# Patient Record
Sex: FEMALE | Race: WHITE | NOT HISPANIC OR LATINO | Employment: OTHER | URBAN - METROPOLITAN AREA
[De-identification: names, ages, dates, MRNs, and addresses within clinical notes are randomized per-mention and may not be internally consistent; named-entity substitution may affect disease eponyms.]

---

## 2018-06-16 ENCOUNTER — HOSPITAL ENCOUNTER (EMERGENCY)
Facility: CLINIC | Age: 63
Discharge: HOME | End: 2018-06-16
Attending: EMERGENCY MEDICINE | Admitting: EMERGENCY MEDICINE
Payer: MEDICARE

## 2018-06-16 ENCOUNTER — APPOINTMENT (OUTPATIENT)
Dept: GENERAL RADIOLOGY | Facility: CLINIC | Age: 63
End: 2018-06-16
Attending: EMERGENCY MEDICINE
Payer: MEDICARE

## 2018-06-16 VITALS
HEIGHT: 64 IN | OXYGEN SATURATION: 98 % | BODY MASS INDEX: 22.02 KG/M2 | WEIGHT: 129 LBS | RESPIRATION RATE: 16 BRPM | TEMPERATURE: 99 F | HEART RATE: 67 BPM | SYSTOLIC BLOOD PRESSURE: 131 MMHG | DIASTOLIC BLOOD PRESSURE: 65 MMHG

## 2018-06-16 DIAGNOSIS — S62.102A FRACTURE OF WRIST, LEFT, CLOSED, INITIAL ENCOUNTER: ICD-10-CM

## 2018-06-16 PROCEDURE — 29125 APPL SHORT ARM SPLINT STATIC: CPT | Mod: LT

## 2018-06-16 PROCEDURE — 73100 X-RAY EXAM OF WRIST: CPT | Mod: LT

## 2018-06-16 PROCEDURE — 73080 X-RAY EXAM OF ELBOW: CPT | Mod: LT

## 2018-06-16 PROCEDURE — A9270 NON-COVERED ITEM OR SERVICE: HCPCS | Mod: GY | Performed by: EMERGENCY MEDICINE

## 2018-06-16 PROCEDURE — 25000132 ZZH RX MED GY IP 250 OP 250 PS 637: Mod: GY | Performed by: EMERGENCY MEDICINE

## 2018-06-16 PROCEDURE — 99284 EMERGENCY DEPT VISIT MOD MDM: CPT

## 2018-06-16 RX ORDER — HYDROCODONE BITARTRATE AND ACETAMINOPHEN 5; 325 MG/1; MG/1
1 TABLET ORAL ONCE
Status: COMPLETED | OUTPATIENT
Start: 2018-06-16 | End: 2018-06-16

## 2018-06-16 RX ORDER — HYDROCODONE BITARTRATE AND ACETAMINOPHEN 5; 325 MG/1; MG/1
1 TABLET ORAL EVERY 4 HOURS PRN
Qty: 18 TABLET | Refills: 0 | Status: SHIPPED | OUTPATIENT
Start: 2018-06-16 | End: 2024-08-02

## 2018-06-16 RX ADMIN — HYDROCODONE BITARTRATE AND ACETAMINOPHEN 1 TABLET: 5; 325 TABLET ORAL at 11:47

## 2018-06-16 NOTE — ED TRIAGE NOTES
Pt slipped and fell yesterday while walking on slippery wet grass.  She has splint on left arm where she reports distal fracture.  She now has pain in elbow and feels the cast is too big and too tight.

## 2018-06-16 NOTE — ED AVS SNAPSHOT
[facility] Emergency Department    [address]    [facility] [address]    Phone:  [phone]    Fax:  [phone]                                       Hoa Britt   MRN: [id number]    Department:  [facility] Emergency Department   Date of Visit:  6/16/2018           After Visit Summary Signature Page     I have received my discharge instructions, and my questions have been answered. I have discussed any challenges I see with this plan with the nurse or doctor.    ..........................................................................................................................................  Patient/Patient Representative Signature      ..........................................................................................................................................  Patient Representative Print Name and Relationship to Patient    ..................................................               ................................................  Date                                            Time    ..........................................................................................................................................  Reviewed by Signature/Title    ...................................................              ..............................................  Date                                                            Time

## 2018-06-16 NOTE — ED PROVIDER NOTES
"  History     Chief Complaint:  Arm Pain    HPI   Hoa Britt is a 63 year old female who presents with her friendly neighbor for evaluation of arm pain. The patient reports that she slipped and fell yesterday while walking on slippery wet grass. Patient reports that she landed on the left elbow. Today she presents to the ED with a splint on left arm where she reports she had a distal fracture.  In addition, the patient reports that her  passed away last night after a long history of illness. She reports that the  is tomorrow and would like to have the splint taken care off because it feels too bulky and is too tight.     Allergies:  No Known Allergies     Medications:    Ativan  Hydrocone - acetaminophen  Antivert  Multivitamins  Zofran  Paxil  Compazine  Evista  Ambien CR PO     Past Medical History:    small bowel obstruction  Hypotension  Nausea and vomiting  Volvulus  Neck pain    Past Surgical History:    Colectomy  Colonoscopy  Laparotomy  Resect Small bowel without ostomy    Family History:    No family history on file.    Social History:  The patient reports that she has never smoked. She does not have any smokeless tobacco history on file. She reports that she does not drink alcohol.   Marital Status:   [2]     Review of Systems   All other systems reviewed and are negative.    Physical Exam   First Vitals:  BP: 127/84  Pulse: 67  Heart Rate: 97  Temp: 99  F (37.2  C)  Resp: 18  Height: 162.6 cm (5' 4\")  Weight: 58.5 kg (129 lb)  SpO2: 98 %      Physical Exam  General: Pleasant, comfortable and alert.   HEENT:   The scalp and head appear normal    The pupils are equal, round, and reactive to light    Extraocular muscles are intact.    The nose is normal.    The oropharynx is normal.      Uvula is in the midline.    Neck:  Normal range of motion.    Lungs:  Clear.      No rales, no wheezing.      There is no tachypnea.  Non-labored.  Cardiac: Regular rate.      Normal S1 and S2.  "     No pathological murmur/rub    Abdomen: Soft. No distension, no localized tenderness or rebound.  MS:  Sugar tong splint on left upper extremity removed because of tightness, revealed swelling at the wrist.   No effusion at elbow.   Neurologic:     Normal mentation.  No cranial nerve deficits.  No focal motor or sensory                            changes.  Neurovascular exam intact distally.     Speech normal.  Psych:  Awake.     Alert.      Normal affect.      Appropriate interactions.    Sad at times, Grieving the loss of her .   Skin:  No rash.      No lesions.      Emergency Department Course   Imaging:  XR Wrist Left 2 Views   Final Result   IMPRESSION: There is a minimally impacted, comminuted, nonangulated   fracture through the distal metaphysis of the left radius. There is a   nondisplaced fracture of the left ulnar styloid. No other fractures.      SHANNON PADGETT MD      Elbow  XR, G/E 3 views, left   Final Result   IMPRESSION: No evidence for fracture, dislocation or significant   degenerative change of the left elbow.      SHANNON PADGETT MD         Interventions:  1147: [place]        Emergency Department Course:  Past medical records, nursing notes, and vitals reviewed.  1028: I performed an exam of the patient and obtained history, as documented above.         The patient was sent for imaging study while in the emergency department, findings above.     I spoke with Dr. Mcduffie regarding the patient.     Rechecked the patient, findings and plan explained to the patient. Patient discharged home, status improved, with instructions regarding supportive care, medications, and reasons to return as well as the importance of close follow-up was reviewed.     Impression & Plan    Medical Decision Making:  Hoa Britt is a 63 year old female who fractured her wrist yesterday and was seen at Urgent Care, splinted appropriately, who comes in with complaining of pain in the splint. She also reports that  jenny has a  to go to tomorrow of her  who just  yesterday and doesn't want her elbow immobilized. I spoke with Dr. Mcduffie who is on call for ortho who looked at the X rays and felt that as long as she did not bend her wrist and use her left arm or hand a lot, she could be placed in the [place], short arm wrist splint which we did. She feels much more comfortable now. She also did not receive any analgesics with which to go home with yesterday. We will send her home with Vicodin. Gave her precautionary instructions to follow up with ortho early next week. She reports that she can't go in until Thursday or Friday, today being Saturday. If she develops problems in the interim she should return here.       Diagnosis:    ICD-10-CM    1. Fracture of wrist, left, closed, initial encounter S62.102A        Disposition:  discharged to home    Discharge Medications:  New Prescriptions    HYDROCODONE-ACETAMINOPHEN (NORCO) 5-325 MG PER TABLET    Take 1 tablet by mouth every 4 hours as needed for pain     Macy COATES, brandy serving as a scribe at 10:28 AM on 2018 to document services personally performed by Marshall Mcgraw MD based on my observations and the provider's statements to me.     [facility] EMERGENCY DEPARTMENT       Marshall Mcgraw MD  [phone]

## 2018-06-16 NOTE — DISCHARGE INSTRUCTIONS
Wrist Fracture, General  You have a broken bone (fracture) in your wrist. This may be a small crack or chip in the bone. Or it may be a major break, with the broken parts pushed out of position. Wrist fractures are often treated with a splint or cast. They take about 4 to 6 weeks to heal. Severe injuries may need surgery.    Home care  Follow these guidelines when caring for yourself at home:    Keep your arm elevated to reduce pain and swelling. When sitting or lying down keep your arm above the level of your heart. You can do this by placing your arm on a pillow that rests on your chest or on a pillow at your side. This is most important during the first 2 days (48 hours) after the injury.    Put an ice pack on the injured area. Do this for 20 minutes every 1 to 2 hours the first day for pain relief. You can make an ice pack by wrapping a plastic bag of ice cubes in a thin towel. As the ice melts, be careful that the cast or splint doesn t get wet. Continue using the ice pack 3 to 4 times a day for the next 2 days. Then use the ice pack as needed to ease pain and swelling.    Keep the cast or splint completely dry at all times. Bathe with your cast or splint out of the water. Protect it with a large plastic bag, rubber-banded at the top end. If a fiberglass cast or splint gets wet, you can dry it with a hair dryer on a cool setting.    You may use acetaminophen or ibuprofen to control pain, unless another pain medicine was prescribed. If you have chronic liver or kidney disease, talk with your healthcare provider before using these medicines. Also talk with your provider if you ve had a stomach ulcer or gastrointestinal bleeding.    Don t put creams, lotions, or objects under the cast.  Follow-up care  Follow up with your healthcare provider, or as advised. This is to make sure the bone is healing the way it should. If a splint was put on, it may be changed to a cast during your follow-up visit. A cast may need  to be changed at 2 to 3 weeks, as the swelling goes down.  If X-rays were taken, a radiologist may look at them. You will be told of any new findings that may affect your care.  When to seek medical advice  Call your healthcare provider right away if any of these occur:    The plaster cast or splint becomes wet or soft    The cast or splint cracks    Bad odor from the cast or wound fluid stains the cast    The fiberglass cast or splint stays wet for more than 24 hours    Tightness or pain under the cast or splint gets worse    Fingers become swollen, cold, blue, numb, or tingly    You can t move your fingers    Skin around cast becomes red, swollen, or irritated  Date Last Reviewed: 5/1/2017 2000-2017 [employer]. [address] [place] [address], [place] [address]. All rights reserved. This information is not intended as a substitute for professional medical care. Always follow your healthcare professional's instructions.

## 2018-06-16 NOTE — ED AVS SNAPSHOT
[facility] Emergency Department    [address] [place] [address]    Phone:  [phone]    Fax:  [phone]                                       Hoa Britt   MRN: [id number]    Department:  [facility] Emergency Department   Date of Visit:  6/16/2018           Patient Information     Date Of Birth          1955        Your diagnoses for this visit were:     Fracture of wrist, left, closed, initial encounter        You were seen by Marshall Mcgraw MD.      Follow-up Information     Follow up with Claire Nicole MD.    Specialty:  Orthopedics    Contact information:    [facility] ORTHOPEDICS  [facility] [address] [address]  [phone]          Discharge Instructions         Wrist Fracture, General  You have a broken bone (fracture) in your wrist. This may be a small crack or chip in the bone. Or it may be a major break, with the broken parts pushed out of position. Wrist fractures are often treated with a splint or cast. They take about 4 to 6 weeks to heal. Severe injuries may need surgery.    Home care  Follow these guidelines when caring for yourself at home:    Keep your arm elevated to reduce pain and swelling. When sitting or lying down keep your arm above the level of your heart. You can do this by placing your arm on a pillow that rests on your chest or on a pillow at your side. This is most important during the first 2 days (48 hours) after the injury.    Put an ice pack on the injured area. Do this for 20 minutes every 1 to 2 hours the first day for pain relief. You can make an ice pack by wrapping a plastic bag of ice cubes in a thin towel. As the ice melts, be careful that the cast or splint doesn t get wet. Continue using the ice pack 3 to 4 times a day for the next 2 days. Then use the ice pack as needed to ease pain and swelling.    Keep the cast or splint completely dry at all times. Bathe with your cast or splint out of the  water. Protect it with a large plastic bag, rubber-banded at the top end. If a fiberglass cast or splint gets wet, you can dry it with a hair dryer on a cool setting.    You may use acetaminophen or ibuprofen to control pain, unless another pain medicine was prescribed. If you have chronic liver or kidney disease, talk with your healthcare provider before using these medicines. Also talk with your provider if you ve had a stomach ulcer or gastrointestinal bleeding.    Don t put creams, lotions, or objects under the cast.  Follow-up care  Follow up with your healthcare provider, or as advised. This is to make sure the bone is healing the way it should. If a splint was put on, it may be changed to a cast during your follow-up visit. A cast may need to be changed at 2 to 3 weeks, as the swelling goes down.  If X-rays were taken, a radiologist may look at them. You will be told of any new findings that may affect your care.  When to seek medical advice  Call your healthcare provider right away if any of these occur:    The plaster cast or splint becomes wet or soft    The cast or splint cracks    Bad odor from the cast or wound fluid stains the cast    The fiberglass cast or splint stays wet for more than 24 hours    Tightness or pain under the cast or splint gets worse    Fingers become swollen, cold, blue, numb, or tingly    You can t move your fingers    Skin around cast becomes red, swollen, or irritated  Date Last Reviewed: 5/1/2017 2000-2017 [employer]. [address] [place] [address]. All rights reserved. This information is not intended as a substitute for professional medical care. Always follow your healthcare professional's instructions.          24 Hour Appointment Hotline       To make an appointment at any [facility], call [phone] ([phone]). If you don't have a family doctor or clinic, we will help you find one. [facility] are conveniently located to  serve the needs of you and your family.             Review of your medicines      CONTINUE these medicines which may have CHANGED, or have new prescriptions. If we are uncertain of the size of tablets/capsules you have at home, strength may be listed as something that might have changed.        Dose / Directions Last dose taken    HYDROcodone-acetaminophen 5-325 MG per tablet   Commonly known as:  NORCO   Dose:  1 tablet   What changed:    - how much to take  - reasons to take this   Quantity:  18 tablet        Take 1 tablet by mouth every 4 hours as needed for pain   Refills:  0          Our records show that you are taking the medicines listed below. If these are incorrect, please call your family doctor or clinic.        Dose / Directions Last dose taken    AMBIEN CR PO   Dose:  6.25-12.5 mg        Take 6.25-12.5 mg by mouth nightly as needed. Usually takes 1/2 of a 12.5mg CR at hs prn.   Refills:  0        * LORazepam 2 MG tablet   Commonly known as:  ATIVAN   Dose:  1 mg   Quantity:  14 tablet        Take 0.5 tablets by mouth nightly as needed for anxiety.   Refills:  0        * LORazepam 2 MG tablet   Commonly known as:  ATIVAN   Dose:  0.5-1 mg   Quantity:  30 tablet        Take 0.5 tablets by mouth every 6 hours as needed for anxiety.   Refills:  0        meclizine 12.5 MG tablet   Commonly known as:  ANTIVERT   Dose:  25 mg   Quantity:  30 tablet        Take 2 tablets by mouth 4 times daily as needed for dizziness.   Refills:  0        multivitamin, therapeutic with minerals Tabs tablet   Dose:  1 tablet        Take 1 tablet by mouth daily.   Refills:  0        ondansetron 4 MG ODT tab   Commonly known as:  ZOFRAN-ODT   Dose:  4 mg        Take 4 mg by mouth every 8 hours as needed. new Rx 4-4-08-patient unsure of dose.   Refills:  0        PARoxetine 20 MG tablet   Commonly known as:  PAXIL   Dose:  20 mg   Quantity:  30 tablet        Take 1 tablet by mouth At Bedtime.   Refills:  1        prochlorperazine  5 MG tablet   Commonly known as:  COMPAZINE   Dose:  5-10 mg   Quantity:  20 tablet        Take 1-2 tablets by mouth every 6 hours as needed.   Refills:  0        raloxifene 60 MG tablet   Commonly known as:  Evista   Dose:  60 mg        Take 60 mg by mouth daily.   Refills:  0        * Notice:  This list has 2 medication(s) that are the same as other medications prescribed for you. Read the directions carefully, and ask your doctor or other care provider to review them with you.            Information about OPIOIDS     PRESCRIPTION OPIOIDS: WHAT YOU NEED TO KNOW   We gave you an opioid (narcotic) pain medicine. It is important to manage your pain, but opioids are not always the best choice. You should first try all the other options your care team gave you. Take this medicine for as short a time (and as few doses) as possible.     These medicines have risks:    DO NOT drive when on new or higher doses of pain medicine. These medicines can affect your alertness and reaction times, and you could be arrested for driving under the influence (DUI). If you need to use opioids long-term, talk to your care team about driving.    DO NOT operate heave machinery    DO NOT do any other dangerous activities while taking these medicines.     DO NOT drink any alcohol while taking these medicines.      If the opioid prescribed includes acetaminophen, DO NOT take with any other medicines that contain acetaminophen. Read all labels carefully. Look for the word  acetaminophen  or  Tylenol.  Ask your pharmacist if you have questions or are unsure.    You can get addicted to pain medicines, especially if you have a history of addiction (chemical, alcohol or substance dependence). Talk to your care team about ways to reduce this risk.    Store your pills in a secure place, locked if possible. We will not replace any lost or stolen medicine. If you don t finish your medicine, please throw away (dispose) as directed by your pharmacist.  The [place] Pollution Control Agency has more information about safe disposal: https://www.pca.[facility].[place]/living-green/managing-unwanted-medications.     All opioids tend to cause constipation. Drink plenty of water and eat foods that have a lot of fiber, such as fruits, vegetables, prune juice, apple juice and high-fiber cereal. Take a laxative (Miralax, milk of magnesia, Colace, Senna) if you don t move your bowels at least every other day.         Prescriptions were sent or printed at these locations (1 Prescription)                   Other Prescriptions                Printed at Department/Unit printer (1 of 1)         HYDROcodone-acetaminophen (NORCO) 5-325 MG per tablet                Procedures and tests performed during your visit     Elbow  XR, G/E 3 views, left    XR Wrist Left 2 Views      Orders Needing Specimen Collection     None      Pending Results     No orders found from 6/14/2018 to 6/17/2018.            Pending Culture Results     No orders found from 6/14/2018 to 6/17/2018.            Pending Results Instructions     If you had any lab results that were not finalized at the time of your Discharge, you can call the ED Lab Result RN at [phone]. You will be contacted by this team for any positive Lab results or changes in treatment. The nurses are available 7 days a week from 10A to 6:30P.  You can leave a message 24 hours per day and they will return your call.        Test Results From Your Hospital Stay        6/16/2018 11:18 AM      Narrative     LEFT WRIST TWO VIEWS  6/16/2018 11:03 AM     COMPARISON: None.    HISTORY: Wrist injury.         Impression     IMPRESSION: There is a minimally impacted, comminuted, nonangulated  fracture through the distal metaphysis of the left radius. There is a  nondisplaced fracture of the left ulnar styloid. No other fractures.    SHANNON PADGETT MD         6/16/2018 11:17 AM      Narrative     LEFT ELBOW THREE OR MORE VIEWS  6/16/2018 11:03 AM      COMPARISON: None.    HISTORY: Injury.     FINDINGS: The visualized bones and joint spaces are within normal  limits.        Impression     IMPRESSION: No evidence for fracture, dislocation or significant  degenerative change of the left elbow.    SHANNON PADGETT MD                Clinical Quality Measure: Blood Pressure Screening     Your blood pressure was checked while you were in the emergency department today. The last reading we obtained was  BP: 127/84 . Please read the guidelines below about what these numbers mean and what you should do about them.  If your systolic blood pressure (the top number) is less than 120 and your diastolic blood pressure (the bottom number) is less than 80, then your blood pressure is normal. There is nothing more that you need to do about it.  If your systolic blood pressure (the top number) is 120-139 or your diastolic blood pressure (the bottom number) is 80-89, your blood pressure may be higher than it should be. You should have your blood pressure rechecked within a year by a primary care provider.  If your systolic blood pressure (the top number) is 140 or greater or your diastolic blood pressure (the bottom number) is 90 or greater, you may have high blood pressure. High blood pressure is treatable, but if left untreated over time it can put you at risk for heart attack, stroke, or kidney failure. You should have your blood pressure rechecked by a primary care provider within the next 4 weeks.  If your provider in the emergency department today gave you specific instructions to follow-up with your doctor or provider even sooner than that, you should follow that instruction and not wait for up to 4 weeks for your follow-up visit.        Thank you for choosing [facility]       Thank you for choosing [facility] for your care. Our goal is always to provide you with excellent care. Hearing back from our patients is one way we can continue to improve our services. Please take a few  "minutes to complete the written survey that you may receive in the mail after you visit with us. Thank you!        [employer] Information     [employer] lets you send messages to your doctor, view your test results, renew your prescriptions, schedule appointments and more. To sign up, go to [email or web address] [email or web address] . Click on \"Log in\" on the left side of the screen, which will take you to the Welcome page. Then click on \"Sign up Now\" on the right side of the page.     You will be asked to enter the access code listed below, as well as some personal information. Please follow the directions to create your username and password.     Your access code is: [email or web address]  Expires: 2018 12:32 PM     Your access code will  in 90 days. If you need help or a new code, please call your [facility] clinic or [phone].        Care EveryWhere ID     This is your Care EveryWhere ID. This could be used by other organizations to access your [facility] medical records  [id number]        Equal Access to Services     [facility]: Hadii evon haider [place], waaxda luqadaha, qaybta kaalmaumberto arciniega, connor abreu . So [facility] [phone].    ATENCIÓN: Si habla español, tiene a ellis disposición servicios gratuitos de asistencia lingüística. Llame al [phone].    We comply with applicable federal civil rights laws and [place] laws. We do not discriminate on the basis of race, color, national origin, age, disability, sex, sexual orientation, or gender identity.            After Visit Summary       This is your record. Keep this with you and show to your community pharmacist(s) and doctor(s) at your next visit.                  "

## 2020-12-28 ENCOUNTER — HOSPITAL ENCOUNTER (OUTPATIENT)
Dept: ULTRASOUND IMAGING | Facility: CLINIC | Age: 65
End: 2020-12-28
Attending: FAMILY MEDICINE
Payer: MEDICARE

## 2020-12-28 ENCOUNTER — HOSPITAL ENCOUNTER (OUTPATIENT)
Dept: MAMMOGRAPHY | Facility: CLINIC | Age: 65
End: 2020-12-28
Attending: FAMILY MEDICINE
Payer: MEDICARE

## 2020-12-28 DIAGNOSIS — R92.8 ABNORMAL MAMMOGRAM: ICD-10-CM

## 2020-12-28 PROCEDURE — G0279 TOMOSYNTHESIS, MAMMO: HCPCS

## 2020-12-28 PROCEDURE — 76642 ULTRASOUND BREAST LIMITED: CPT | Mod: RT

## 2021-04-29 DIAGNOSIS — Z11.59 ENCOUNTER FOR SCREENING FOR OTHER VIRAL DISEASES: ICD-10-CM

## 2021-05-30 ENCOUNTER — HOSPITAL ENCOUNTER (OUTPATIENT)
Dept: LAB | Facility: CLINIC | Age: 66
Discharge: HOME | End: 2021-05-30
Attending: INTERNAL MEDICINE | Admitting: INTERNAL MEDICINE
Payer: MEDICARE

## 2021-05-30 DIAGNOSIS — Z11.59 ENCOUNTER FOR SCREENING FOR OTHER VIRAL DISEASES: ICD-10-CM

## 2021-05-30 LAB
LABORATORY COMMENT REPORT: NORMAL
SARS-COV-2 RNA RESP QL NAA+PROBE: NEGATIVE
SARS-COV-2 RNA RESP QL NAA+PROBE: NORMAL
SPECIMEN SOURCE: NORMAL
SPECIMEN SOURCE: NORMAL

## 2021-05-30 PROCEDURE — U0003 INFECTIOUS AGENT DETECTION BY NUCLEIC ACID (DNA OR RNA); SEVERE ACUTE RESPIRATORY SYNDROME CORONAVIRUS 2 (SARS-COV-2) (CORONAVIRUS DISEASE [COVID-19]), AMPLIFIED PROBE TECHNIQUE, MAKING USE OF HIGH THROUGHPUT TECHNOLOGIES AS DESCRIBED BY CMS-2020-01-R: HCPCS | Performed by: INTERNAL MEDICINE

## 2021-05-30 PROCEDURE — U0005 INFEC AGEN DETEC AMPLI PROBE: HCPCS | Performed by: INTERNAL MEDICINE

## 2021-06-01 RX ORDER — FAMOTIDINE 40 MG/1
40 TABLET, FILM COATED ORAL 2 TIMES DAILY
COMMUNITY

## 2021-06-01 RX ORDER — AMLODIPINE BESYLATE 5 MG/1
5 TABLET ORAL DAILY
COMMUNITY

## 2021-06-03 ENCOUNTER — HOSPITAL ENCOUNTER (OUTPATIENT)
Facility: CLINIC | Age: 66
Discharge: HOME | End: 2021-06-03
Attending: INTERNAL MEDICINE | Admitting: INTERNAL MEDICINE
Payer: MEDICARE

## 2021-06-03 VITALS
TEMPERATURE: 99 F | SYSTOLIC BLOOD PRESSURE: 121 MMHG | RESPIRATION RATE: 16 BRPM | OXYGEN SATURATION: 96 % | HEIGHT: 65 IN | WEIGHT: 123 LBS | BODY MASS INDEX: 20.49 KG/M2 | HEART RATE: 52 BPM | DIASTOLIC BLOOD PRESSURE: 66 MMHG

## 2021-06-03 LAB — UPPER GI ENDOSCOPY: NORMAL

## 2021-06-03 PROCEDURE — 999N000099 HC STATISTIC MODERATE SEDATION < 10 MIN: Performed by: INTERNAL MEDICINE

## 2021-06-03 PROCEDURE — 88305 TISSUE EXAM BY PATHOLOGIST: CPT | Mod: TC | Performed by: INTERNAL MEDICINE

## 2021-06-03 PROCEDURE — 88305 TISSUE EXAM BY PATHOLOGIST: CPT | Mod: 26 | Performed by: PATHOLOGY

## 2021-06-03 PROCEDURE — 250N000011 HC RX IP 250 OP 636: Performed by: INTERNAL MEDICINE

## 2021-06-03 PROCEDURE — 43239 EGD BIOPSY SINGLE/MULTIPLE: CPT | Performed by: INTERNAL MEDICINE

## 2021-06-03 RX ORDER — LIDOCAINE 40 MG/G
CREAM TOPICAL
Status: DISCONTINUED | OUTPATIENT
Start: 2021-06-03 | End: 2021-06-03 | Stop reason: HOSPADM

## 2021-06-03 RX ORDER — NALOXONE HYDROCHLORIDE 0.4 MG/ML
0.4 INJECTION, SOLUTION INTRAMUSCULAR; INTRAVENOUS; SUBCUTANEOUS
Status: DISCONTINUED | OUTPATIENT
Start: 2021-06-03 | End: 2021-06-03 | Stop reason: HOSPADM

## 2021-06-03 RX ORDER — ONDANSETRON 2 MG/ML
4 INJECTION INTRAMUSCULAR; INTRAVENOUS EVERY 6 HOURS PRN
Status: DISCONTINUED | OUTPATIENT
Start: 2021-06-03 | End: 2021-06-03 | Stop reason: HOSPADM

## 2021-06-03 RX ORDER — ONDANSETRON 4 MG/1
4 TABLET, ORALLY DISINTEGRATING ORAL EVERY 6 HOURS PRN
Status: DISCONTINUED | OUTPATIENT
Start: 2021-06-03 | End: 2021-06-03 | Stop reason: HOSPADM

## 2021-06-03 RX ORDER — NALOXONE HYDROCHLORIDE 0.4 MG/ML
0.2 INJECTION, SOLUTION INTRAMUSCULAR; INTRAVENOUS; SUBCUTANEOUS
Status: DISCONTINUED | OUTPATIENT
Start: 2021-06-03 | End: 2021-06-03 | Stop reason: HOSPADM

## 2021-06-03 RX ORDER — FENTANYL CITRATE 50 UG/ML
INJECTION, SOLUTION INTRAMUSCULAR; INTRAVENOUS PRN
Status: COMPLETED | OUTPATIENT
Start: 2021-06-03 | End: 2021-06-03

## 2021-06-03 RX ORDER — PROCHLORPERAZINE MALEATE 5 MG
5 TABLET ORAL EVERY 6 HOURS PRN
Status: DISCONTINUED | OUTPATIENT
Start: 2021-06-03 | End: 2021-06-03 | Stop reason: HOSPADM

## 2021-06-03 RX ORDER — FLUMAZENIL 0.1 MG/ML
0.2 INJECTION, SOLUTION INTRAVENOUS
Status: DISCONTINUED | OUTPATIENT
Start: 2021-06-03 | End: 2021-06-03 | Stop reason: HOSPADM

## 2021-06-03 RX ORDER — ONDANSETRON 2 MG/ML
4 INJECTION INTRAMUSCULAR; INTRAVENOUS
Status: DISCONTINUED | OUTPATIENT
Start: 2021-06-03 | End: 2021-06-03 | Stop reason: HOSPADM

## 2021-06-03 RX ADMIN — FENTANYL CITRATE 50 MCG: 50 INJECTION, SOLUTION INTRAMUSCULAR; INTRAVENOUS at 08:07

## 2021-06-03 RX ADMIN — MIDAZOLAM 1 MG: 1 INJECTION INTRAMUSCULAR; INTRAVENOUS at 08:07

## 2021-06-03 ASSESSMENT — MIFFLIN-ST. JEOR: SCORE: 1080.86

## 2021-06-03 NOTE — LETTER
April 21, 2021      Hoa Britt  [address] [facility] [id number]        Dear Hoa,       Thank you for choosing [facility]. You are scheduled for the following service(s).   Please be aware that coverage of these services is subject to the terms and limitations of your health insurance plan.  Call member services at your health plan with any benefit or coverage questions.    Date:   5/13/21      Procedure: UPPER ENDOSCOPY-EGD  Doctor: Dr. Ramirez Art           Arrival Time:  8:00am    *Enter and check in at the Main Hospital Entrance  Procedure Time: 8:30am       Location:   [facility]        Endoscopy Department, First Floor *         [address] [place], [place] [address] [phone] or [phone] () to reschedule          PRE-PROCEDURE CHECKLIST    If you have diabetes, ask your regular doctor for diet and medication restrictions.  If you take any antiplatelet or anticoagulant medications (such as Coumadin, Lovenox, Plavix, etc.) and have not already discussed this, please call your primary physician for advice on holding this medication.  If you take Aspirin, you may continue to do so.  If you are or may be pregnant, please discuss the risks and benefits of this procedure with your doctor.  You must arrange for a ride for the day of your exam. If you fail to arrange transportation with a responsible adult, your procedure will need to be cancelled and rescheduled. Taxi, bus and medical transport are not acceptable unless you have a responsible adult that you know & trust with you. Please arrange for this  to be able to pick you up in our department, approximately one hour after your scheduled procedure, if they are not able to stay with you.      Canceling or rescheduling   If you must cancel or reschedule your appointment, please call [phone] as soon as possible.      Upper Endoscopy or  Esophagogastroduodenoscopy (EGD) is a test performed to evaluate symptoms of persistent abdominal pain, nausea, vomiting and difficulty swallowing. It may also be used to treat various conditions of the upper gastrointestinal tract, such as bleeding, narrowing or abnormal growths.     What happens during an upper endoscopy?  On the day of your procedure, plan to spend up to one and a half hour after your arrival at the endoscopy center. The exam itself takes about 5 to 10 minutes.    Before the exam:  - You will change into a gown.   - Your medical history and medication list will be reviewed with you, unless it has already been done over the phone.   - A nurse will insert an intravenous (IV) line into your hand or arm.  - The doctor will talk to you and give you a consent form to sign.    During the exam:  - Medicine will be given through the IV line to help you relax and feel comfortable.   - Your heart rate and oxygen levels will be monitored. If your blood pressure is low, you may be given fluids through the IV line.   - The doctor will insert a flexible, hollow tube, called an endoscope, into your mouth and will advance it slowly through the esophagus, stomach and duodenum (the first part of your small intestine).   - You may have a feeling of pressure or fullness.   - If you have difficulty swallowing, and the doctor finds a narrowing in your esophagus, it may be possible for the area to be expanded-dilated during the exam.   - If abnormal tissue is found, the doctor may remove it through the endoscope (biopsy it) for closer examination. The tissue removal is painless.    After the exam:  - Any tissue samples removed during the exam will be sent to a lab for evaluation. It may take 5 to 7 working days for you to be notified of the results  - The doctor will prepare a full report for the physician who referred you for the upper endoscopy.   - The doctor will talk with you about the initial results of your exam.    - You may feel bloated after the procedure. That is normal and should not last long.   - Your throat may feel sore for a short time.   - Following the exam, you may resume your normal diet. Avoid alcohol until the next day.   - You may resume your regular activities the day after the procedure.   - Medication given during the exam will prohibit you from driving for the rest of the day.  - A nurse will provide you with complete discharge instructions before you leave the endoscopy center. Be sure to ask the nurse for specific instructions if you take blood thinners such as Aspirin , Coumadin , Lovenox , Plavix , etc.       PREPARATION    To ensure a successful exam, please follow all instructions carefully.      The night before your exam:    STOP eating solid foods at 11:45 pm.     Clear liquids are okay to drink (examples: Gatorade , apple juice, clear broth,coffee or tea without milk or cream, etc.).     DO NOT drink red liquids or alcoholic beverages.    The day of your exam:    STOP drinking clear liquids 4 hours before your exam.     You may take your usual medications with 4 oz. of water, but it needs to be at least 4 hours prior to your procedure.    When you leave for the procedure:    Bring a list of all of your current medications, including any allergy or over-the-counter medications, unless you have already reviewed that with an Endoscopy RN over the phone.     Bring a photo ID as well as up-to-date insurance information, such as your insurance card and any referral forms that might be required by your payer.             DIRECTIONS TO THE ENDOSCOPY DEPARTMENT    From the north ([facility])  Take [address] South, exit on [address]. Get into the left hand jerome, turn left (east), go one-half mile to [address] and turn left. Go north to the second stoplight, take a right on [address] and follow it to the Main Hospital entrance.  From the south ([facility]  [place])  Take 35N to the [address]E split and exit on [address] . On [address] , turn left (west) to [address]. Turn right (north) on [address]. Go north to the second stoplight, take a right on [address] and follow it to the Main Hospital entrance.  From the east via 35E ([facility])  Take [address]E south to [address]  exit. Turn right on [address] . Go west to [address]. Turn right (north) on [address]. Go to the second stoplight, take a right on [address] to the Main Hospital entrance.  From the east via [address] ([facility])  Take [address] to [address]. Turn left (south) on [address] to [address], turn left (east) on [address] and follow it to the Main Hospital entrance.    From the west via [address] ([place])  Take [address] east to [address]. Turn right (south) on [address] to [address], turn left (east) on [address] and follow it to the Main Hospital entrance.

## 2021-06-03 NOTE — H&P
Pre-Endoscopy History and Physical     Hoa Britt MRN# [id number]   YOB: 1955 Age: 66 year old     Date of Procedure: 6/3/2021  Primary care provider: Marilu Jeffrey  Type of Endoscopy: Gastroscopy with possible biopsy, possible dilation  Reason for Procedure: weight loss  Type of Anesthesia Anticipated: Conscious Sedation    HPI:    Hoa is a 66 year old female who will be undergoing the above procedure.      A history and physical has been performed. The patient's medications and allergies have been reviewed. The risks and benefits of the procedure and the sedation options and risks were discussed with the patient.  All questions were answered and informed consent was obtained.      She denies a personal or family history of anesthesia complications or bleeding disorders.     Patient Active Problem List   Diagnosis     Neck pain     CARDIOVASCULAR SCREENING; LDL GOAL LESS THAN 130     Volvulus (H)     Nausea and vomiting     Hypotension     Small bowel obstruction (H)        History reviewed. No pertinent past medical history.     Past Surgical History:   Procedure Laterality Date     COLECTOMY RIGHT  2/3/2012    Procedure:COLECTOMY RIGHT; Colectomy right; Surgeon:BRIAN ZAPATA; Location: OR     COLONOSCOPY  4/16/2012    Procedure:COLONOSCOPY; colonoscopy; Surgeon:ROJELIO RAHMAN; Location: GI     HERNIA REPAIR  2016     LAPAROTOMY EXPLORATORY  3/1/2012    Procedure:LAPAROTOMY EXPLORATORY; Exploratory Laparotomy, Small Bowel Resection, Lysis of adhesions; Surgeon:BRIAN ZAPATA; Location: OR     LAPAROTOMY EXPLORATORY  3/12/2012    Procedure:LAPAROTOMY EXPLORATORY; Reclosure of abdominal Incision with Wound Vac placement; Surgeon:BRIAN ZAPATA; Location: OR     RESECT SMALL BOWEL WITHOUT OSTOMY  3/1/2012    Procedure:RESECT SMALL BOWEL WITHOUT OSTOMY; Surgeon:BRIAN ZAPATA; Location: OR       Social History     Tobacco Use     Smoking status: Never Smoker      Smokeless tobacco: Never Used   Substance Use Topics     Alcohol use: Yes     Comment: occasionally       History reviewed. No pertinent family history.    Prior to Admission medications    Medication Sig Start Date End Date Taking? Authorizing Provider   amLODIPine (NORVASC) 2.5 MG tablet Take 2.5 mg by mouth daily   Yes Reported, Patient   famotidine (PEPCID) 10 MG tablet Take 10 mg by mouth 2 times daily   Yes Reported, Patient   multivitamin, therapeutic with minerals (THERA-VIT-M) TABS Take 1 tablet by mouth daily.   Yes Reported, Patient   PARoxetine (PAXIL) 20 MG tablet Take 1 tablet by mouth At Bedtime. 5/26/12  Yes Celine Driver MD   raloxifene (EVISTA) 60 MG tablet Take 60 mg by mouth daily.   Yes Unknown, Entered By History   HYDROcodone-acetaminophen (NORCO) 5-325 MG per tablet Take 1 tablet by mouth every 4 hours as needed for pain 6/16/18   Marshall Mcgraw MD   LORazepam (ATIVAN) 2 MG tablet Take 0.5 tablets by mouth every 6 hours as needed for anxiety. 2/17/13   Aly Crowley MD   LORazepam (ATIVAN) 2 MG tablet Take 0.5 tablets by mouth nightly as needed for anxiety. 4/17/12   Nia Hardwick PA-C   meclizine (ANTIVERT) 12.5 MG tablet Take 2 tablets by mouth 4 times daily as needed for dizziness. 2/17/13   Aly Crowley MD   ondansetron (ZOFRAN-ODT) 4 MG disintegrating tablet Take 4 mg by mouth every 8 hours as needed. new Rx 4-2-12-patient unsure of dose.    Unknown, Entered By History   prochlorperazine (COMPAZINE) 5 MG tablet Take 1-2 tablets by mouth every 6 hours as needed. 2/16/12   Jerson Brooks MD   Zolpidem Tartrate (AMBIEN CR PO) Take 6.25-12.5 mg by mouth nightly as needed. Usually takes 1/2 of a 12.5mg CR at hs prn.     Unknown, Entered By History       No Known Allergies     REVIEW OF SYSTEMS:   5 point ROS negative except as noted above in HPI, including Gen., Resp., CV, GI &  system review.    PHYSICAL EXAM:   /78   Pulse 57   Temp 99  F (37.2  " C) (Temporal)   Resp 14   Ht 1.638 m (5' 4.5\")   Wt 55.8 kg (123 lb)   SpO2 96%   BMI 20.79 kg/m   Estimated body mass index is 20.79 kg/m  as calculated from the following:    Height as of this encounter: 1.638 m (5' 4.5\").    Weight as of this encounter: 55.8 kg (123 lb).   GENERAL APPEARANCE: alert, and oriented  MENTAL STATUS: alert  AIRWAY EXAM: Mallampatti Class I (visualization of the soft palate, fauces, uvula, anterior and posterior pillars)  RESP: lungs clear to auscultation - no rales, rhonchi or wheezes  CV: regular rates and rhythm  DIAGNOSTICS:    Not indicated    IMPRESSION   ASA Class 2 - Mild systemic disease    PLAN:   Plan for Gastroscopy with possible biopsy, possible dilation. We discussed the risks, benefits and alternatives and the patient wished to proceed.    The above has been forwarded to the consulting provider.      Signed Electronically by: Ramirez Art MD  Petty 3, 2021          "

## 2021-06-03 NOTE — DISCHARGE INSTRUCTIONS
Understanding H. pylori and Ulcers  Traditionally, ulcers, or sores in the lining of your digestive tract, were thought to be caused by too much spicy food, stress, or an anxious personality. We now know that most ulcers are probably due to infection with bacteria known as Helicobacter pylori (H. pylori).     H. pylori invade and disturb the lining of the digestive tract. Acid may weaken the area, causing an ulcer.      Common Ulcer Symptoms  Burning, cramping, or hunger-like pain in the stomach area, often one to three hours after a meal or in the middle of the night  Pain that gets better or worse with eating  Nausea or vomiting  Black, tarry, or bloody stools (which means the ulcer is bleeding)  Or you may have no symptoms.     An ulcer can form in two areas of the digestive tract; the stomach and the duodenum (where the stomach meets the small intestine).      Your Evaluation  An evaluation by your doctor can show if you have an ulcer and determine whether it was caused by H. pylori. Your doctor may ask you questions, examine you, and possibly do some tests. These may include:  A special X-ray called a barium upper gastrointestinal series, to help locate an ulcer. During the test, you drink a chalky liquid. This liquid helps the ulcer show up on the X-ray.  An endoscopic exam, done with a long tube passed through your mouth into your stomach, to give the doctor a closer look at your ulcer. You will be lightly sedated for this procedure. Your doctor can also take a tissue sample to test for H. pylori.  Blood, stool, and breath tests are also available to show whether you have H. pylori in your digestive tract.  Your Treatment  To kill H. pylori so your ulcer can heal, your doctor will probably prescribe antibiotics. Other ulcer medications that help reduce stomach acid may also be prescribed as well. Testing after treatment is recommended to be sure the H. pylori infection is gone. Usually, killing H. pylori  helps keep the ulcer from returning.    [id number] [employer], [address] [place] [address], [place] [address]. All rights reserved. This information is not intended as a substitute for professional medical care. Always follow your healthcare professional's instructions.

## 2021-06-04 LAB — COPATH REPORT: NORMAL

## 2024-08-02 ENCOUNTER — APPOINTMENT (OUTPATIENT)
Dept: CT IMAGING | Facility: CLINIC | Age: 69
End: 2024-08-02
Attending: EMERGENCY MEDICINE
Payer: MEDICARE

## 2024-08-02 ENCOUNTER — HOSPITAL ENCOUNTER (OUTPATIENT)
Facility: CLINIC | Age: 69
Setting detail: OBSERVATION
Discharge: HOME | End: 2024-08-03
Attending: EMERGENCY MEDICINE | Admitting: STUDENT IN AN ORGANIZED HEALTH CARE EDUCATION/TRAINING PROGRAM
Payer: MEDICARE

## 2024-08-02 ENCOUNTER — APPOINTMENT (OUTPATIENT)
Dept: MRI IMAGING | Facility: CLINIC | Age: 69
End: 2024-08-02
Attending: EMERGENCY MEDICINE
Payer: MEDICARE

## 2024-08-02 DIAGNOSIS — R20.0 RIGHT SIDED NUMBNESS: ICD-10-CM

## 2024-08-02 DIAGNOSIS — I10 ACCELERATED HYPERTENSION: ICD-10-CM

## 2024-08-02 LAB
ANION GAP SERPL CALCULATED.3IONS-SCNC: 14 MMOL/L (ref 7–15)
APTT PPP: 29 SECONDS (ref 22–38)
ATRIAL RATE - MUSE: 61 BPM
BASOPHILS # BLD AUTO: 0.1 10E3/UL (ref 0–0.2)
BASOPHILS NFR BLD AUTO: 1 %
BUN SERPL-MCNC: 11.9 MG/DL (ref 8–23)
CALCIUM SERPL-MCNC: 9.6 MG/DL (ref 8.8–10.4)
CHLORIDE SERPL-SCNC: 96 MMOL/L (ref 98–107)
CHOLEST SERPL-MCNC: 241 MG/DL
CREAT SERPL-MCNC: 0.58 MG/DL (ref 0.51–0.95)
DIASTOLIC BLOOD PRESSURE - MUSE: NORMAL MMHG
EGFRCR SERPLBLD CKD-EPI 2021: >90 ML/MIN/1.73M2
EOSINOPHIL # BLD AUTO: 0.3 10E3/UL (ref 0–0.7)
EOSINOPHIL NFR BLD AUTO: 4 %
ERYTHROCYTE [DISTWIDTH] IN BLOOD BY AUTOMATED COUNT: 13.8 % (ref 10–15)
GLUCOSE BLDC GLUCOMTR-MCNC: 111 MG/DL (ref 70–99)
GLUCOSE BLDC GLUCOMTR-MCNC: 118 MG/DL (ref 70–99)
GLUCOSE SERPL-MCNC: 116 MG/DL (ref 70–99)
HBA1C MFR BLD: 5.4 %
HCO3 SERPL-SCNC: 23 MMOL/L (ref 22–29)
HCT VFR BLD AUTO: 39.4 % (ref 35–47)
HDLC SERPL-MCNC: 63 MG/DL
HGB BLD-MCNC: 13.3 G/DL (ref 11.7–15.7)
HOLD SPECIMEN: NORMAL
IMM GRANULOCYTES # BLD: 0 10E3/UL
IMM GRANULOCYTES NFR BLD: 0 %
INR PPP: 0.94 (ref 0.85–1.15)
INTERPRETATION ECG - MUSE: NORMAL
LDLC SERPL CALC-MCNC: 135 MG/DL
LYMPHOCYTES # BLD AUTO: 2.9 10E3/UL (ref 0.8–5.3)
LYMPHOCYTES NFR BLD AUTO: 33 %
MCH RBC QN AUTO: 29.8 PG (ref 26.5–33)
MCHC RBC AUTO-ENTMCNC: 33.8 G/DL (ref 31.5–36.5)
MCV RBC AUTO: 88 FL (ref 78–100)
MONOCYTES # BLD AUTO: 0.7 10E3/UL (ref 0–1.3)
MONOCYTES NFR BLD AUTO: 8 %
NEUTROPHILS # BLD AUTO: 4.7 10E3/UL (ref 1.6–8.3)
NEUTROPHILS NFR BLD AUTO: 54 %
NONHDLC SERPL-MCNC: 178 MG/DL
NRBC # BLD AUTO: 0 10E3/UL
NRBC BLD AUTO-RTO: 0 /100
P AXIS - MUSE: 54 DEGREES
PLATELET # BLD AUTO: 369 10E3/UL (ref 150–450)
POTASSIUM SERPL-SCNC: 4.2 MMOL/L (ref 3.4–5.3)
PR INTERVAL - MUSE: 178 MS
QRS DURATION - MUSE: 74 MS
QT - MUSE: 442 MS
QTC - MUSE: 444 MS
R AXIS - MUSE: 55 DEGREES
RBC # BLD AUTO: 4.47 10E6/UL (ref 3.8–5.2)
SODIUM SERPL-SCNC: 133 MMOL/L (ref 135–145)
SYSTOLIC BLOOD PRESSURE - MUSE: NORMAL MMHG
T AXIS - MUSE: 34 DEGREES
TRIGL SERPL-MCNC: 217 MG/DL
TROPONIN T SERPL HS-MCNC: 10 NG/L
VENTRICULAR RATE- MUSE: 61 BPM
WBC # BLD AUTO: 8.7 10E3/UL (ref 4–11)

## 2024-08-02 PROCEDURE — 85025 COMPLETE CBC W/AUTO DIFF WBC: CPT | Performed by: EMERGENCY MEDICINE

## 2024-08-02 PROCEDURE — 250N000011 HC RX IP 250 OP 636: Performed by: EMERGENCY MEDICINE

## 2024-08-02 PROCEDURE — 85730 THROMBOPLASTIN TIME PARTIAL: CPT | Performed by: EMERGENCY MEDICINE

## 2024-08-02 PROCEDURE — 80048 BASIC METABOLIC PNL TOTAL CA: CPT | Performed by: EMERGENCY MEDICINE

## 2024-08-02 PROCEDURE — 93005 ELECTROCARDIOGRAM TRACING: CPT

## 2024-08-02 PROCEDURE — 250N000009 HC RX 250: Performed by: EMERGENCY MEDICINE

## 2024-08-02 PROCEDURE — 255N000002 HC RX 255 OP 636: Performed by: EMERGENCY MEDICINE

## 2024-08-02 PROCEDURE — 70553 MRI BRAIN STEM W/O & W/DYE: CPT | Mod: MA

## 2024-08-02 PROCEDURE — 84484 ASSAY OF TROPONIN QUANT: CPT | Performed by: EMERGENCY MEDICINE

## 2024-08-02 PROCEDURE — 96374 THER/PROPH/DIAG INJ IV PUSH: CPT | Mod: 59

## 2024-08-02 PROCEDURE — 0042T CT HEAD PERFUSION W CONTRAST: CPT

## 2024-08-02 PROCEDURE — 250N000013 HC RX MED GY IP 250 OP 250 PS 637: Performed by: EMERGENCY MEDICINE

## 2024-08-02 PROCEDURE — G0378 HOSPITAL OBSERVATION PER HR: HCPCS

## 2024-08-02 PROCEDURE — 250N000013 HC RX MED GY IP 250 OP 250 PS 637: Performed by: STUDENT IN AN ORGANIZED HEALTH CARE EDUCATION/TRAINING PROGRAM

## 2024-08-02 PROCEDURE — 99207 PR NO BILLABLE SERVICE THIS VISIT: CPT | Performed by: PHYSICIAN ASSISTANT

## 2024-08-02 PROCEDURE — 70496 CT ANGIOGRAPHY HEAD: CPT | Mod: MG

## 2024-08-02 PROCEDURE — 82962 GLUCOSE BLOOD TEST: CPT

## 2024-08-02 PROCEDURE — 70450 CT HEAD/BRAIN W/O DYE: CPT | Mod: MG

## 2024-08-02 PROCEDURE — A9585 GADOBUTROL INJECTION: HCPCS | Performed by: EMERGENCY MEDICINE

## 2024-08-02 PROCEDURE — 99291 CRITICAL CARE FIRST HOUR: CPT | Mod: 25

## 2024-08-02 PROCEDURE — 36415 COLL VENOUS BLD VENIPUNCTURE: CPT | Performed by: EMERGENCY MEDICINE

## 2024-08-02 PROCEDURE — 85610 PROTHROMBIN TIME: CPT | Performed by: EMERGENCY MEDICINE

## 2024-08-02 PROCEDURE — 82465 ASSAY BLD/SERUM CHOLESTEROL: CPT | Performed by: STUDENT IN AN ORGANIZED HEALTH CARE EDUCATION/TRAINING PROGRAM

## 2024-08-02 PROCEDURE — 99222 1ST HOSP IP/OBS MODERATE 55: CPT | Mod: AI | Performed by: STUDENT IN AN ORGANIZED HEALTH CARE EDUCATION/TRAINING PROGRAM

## 2024-08-02 PROCEDURE — 83036 HEMOGLOBIN GLYCOSYLATED A1C: CPT | Performed by: STUDENT IN AN ORGANIZED HEALTH CARE EDUCATION/TRAINING PROGRAM

## 2024-08-02 RX ORDER — GADOBUTROL 604.72 MG/ML
6 INJECTION INTRAVENOUS ONCE
Status: COMPLETED | OUTPATIENT
Start: 2024-08-02 | End: 2024-08-02

## 2024-08-02 RX ORDER — ASPIRIN 81 MG/1
81 TABLET ORAL DAILY
Status: DISCONTINUED | OUTPATIENT
Start: 2024-08-03 | End: 2024-08-03 | Stop reason: HOSPADM

## 2024-08-02 RX ORDER — ACETAMINOPHEN 325 MG/1
650 TABLET ORAL EVERY 4 HOURS PRN
Status: DISCONTINUED | OUTPATIENT
Start: 2024-08-02 | End: 2024-08-03 | Stop reason: HOSPADM

## 2024-08-02 RX ORDER — CALCIUM CARBONATE/VITAMIN D3 600 MG-10
1 TABLET ORAL DAILY
COMMUNITY

## 2024-08-02 RX ORDER — LORAZEPAM 2 MG/ML
0.5 INJECTION INTRAMUSCULAR ONCE
Status: COMPLETED | OUTPATIENT
Start: 2024-08-02 | End: 2024-08-02

## 2024-08-02 RX ORDER — CLOPIDOGREL BISULFATE 75 MG/1
75 TABLET ORAL DAILY
Status: DISCONTINUED | OUTPATIENT
Start: 2024-08-03 | End: 2024-08-03

## 2024-08-02 RX ORDER — FAMOTIDINE 20 MG/1
40 TABLET, FILM COATED ORAL 2 TIMES DAILY
Status: DISCONTINUED | OUTPATIENT
Start: 2024-08-02 | End: 2024-08-03 | Stop reason: HOSPADM

## 2024-08-02 RX ORDER — LIDOCAINE 40 MG/G
CREAM TOPICAL
Status: DISCONTINUED | OUTPATIENT
Start: 2024-08-02 | End: 2024-08-03 | Stop reason: HOSPADM

## 2024-08-02 RX ORDER — HYDROXYZINE HYDROCHLORIDE 10 MG/1
10 TABLET, FILM COATED ORAL
Status: DISCONTINUED | OUTPATIENT
Start: 2024-08-02 | End: 2024-08-03 | Stop reason: HOSPADM

## 2024-08-02 RX ORDER — TRIAMCINOLONE ACETONIDE 1 MG/G
CREAM TOPICAL 2 TIMES DAILY PRN
COMMUNITY

## 2024-08-02 RX ORDER — ACETAMINOPHEN 650 MG/1
650 SUPPOSITORY RECTAL EVERY 4 HOURS PRN
Status: DISCONTINUED | OUTPATIENT
Start: 2024-08-02 | End: 2024-08-03 | Stop reason: HOSPADM

## 2024-08-02 RX ORDER — CLOPIDOGREL BISULFATE 75 MG/1
300 TABLET ORAL ONCE
Status: COMPLETED | OUTPATIENT
Start: 2024-08-02 | End: 2024-08-02

## 2024-08-02 RX ORDER — IOPAMIDOL 755 MG/ML
117 INJECTION, SOLUTION INTRAVASCULAR ONCE
Status: COMPLETED | OUTPATIENT
Start: 2024-08-02 | End: 2024-08-02

## 2024-08-02 RX ORDER — ALBUTEROL SULFATE 90 UG/1
2 AEROSOL, METERED RESPIRATORY (INHALATION) EVERY 6 HOURS PRN
Status: DISCONTINUED | OUTPATIENT
Start: 2024-08-02 | End: 2024-08-03 | Stop reason: HOSPADM

## 2024-08-02 RX ORDER — ALBUTEROL SULFATE 90 UG/1
2 AEROSOL, METERED RESPIRATORY (INHALATION) EVERY 6 HOURS PRN
COMMUNITY

## 2024-08-02 RX ORDER — LANOLIN ALCOHOL/MO/W.PET/CERES
3 CREAM (GRAM) TOPICAL
Status: DISCONTINUED | OUTPATIENT
Start: 2024-08-02 | End: 2024-08-03 | Stop reason: HOSPADM

## 2024-08-02 RX ORDER — ESCITALOPRAM OXALATE 5 MG/1
2.5 TABLET ORAL DAILY
COMMUNITY

## 2024-08-02 RX ORDER — ASPIRIN 81 MG/1
324 TABLET, CHEWABLE ORAL ONCE
Status: COMPLETED | OUTPATIENT
Start: 2024-08-02 | End: 2024-08-02

## 2024-08-02 RX ADMIN — LORAZEPAM 0.5 MG: 2 INJECTION INTRAMUSCULAR; INTRAVENOUS at 15:50

## 2024-08-02 RX ADMIN — ASPIRIN 81 MG CHEWABLE TABLET 324 MG: 81 TABLET CHEWABLE at 15:28

## 2024-08-02 RX ADMIN — GADOBUTROL 6 ML: 604.72 INJECTION INTRAVENOUS at 16:05

## 2024-08-02 RX ADMIN — SODIUM CHLORIDE 95 ML: 9 INJECTION, SOLUTION INTRAVENOUS at 14:47

## 2024-08-02 RX ADMIN — ACETAMINOPHEN 650 MG: 325 TABLET, FILM COATED ORAL at 21:06

## 2024-08-02 RX ADMIN — IOPAMIDOL 117 ML: 755 INJECTION, SOLUTION INTRAVENOUS at 14:46

## 2024-08-02 RX ADMIN — CLOPIDOGREL BISULFATE 300 MG: 75 TABLET ORAL at 15:28

## 2024-08-02 ASSESSMENT — ACTIVITIES OF DAILY LIVING (ADL)
ADLS_ACUITY_SCORE: 21
ADLS_ACUITY_SCORE: 20
ADLS_ACUITY_SCORE: 37
ADLS_ACUITY_SCORE: 21
ADLS_ACUITY_SCORE: 21
ADLS_ACUITY_SCORE: 37
ADLS_ACUITY_SCORE: 21

## 2024-08-02 ASSESSMENT — COLUMBIA-SUICIDE SEVERITY RATING SCALE - C-SSRS
1. IN THE PAST MONTH, HAVE YOU WISHED YOU WERE DEAD OR WISHED YOU COULD GO TO SLEEP AND NOT WAKE UP?: NO
6. HAVE YOU EVER DONE ANYTHING, STARTED TO DO ANYTHING, OR PREPARED TO DO ANYTHING TO END YOUR LIFE?: NO
2. HAVE YOU ACTUALLY HAD ANY THOUGHTS OF KILLING YOURSELF IN THE PAST MONTH?: NO

## 2024-08-02 NOTE — PHARMACY-ADMISSION MEDICATION HISTORY
Pharmacist Admission Medication History    Admission medication history is complete. The information provided in this note is only as accurate as the sources available at the time of the update.    Information Source(s): Patient via in-person    Pertinent Information: none    Changes made to PTA medication list:  Added: Calcium-D, Ventolin, Escitalopram 2.5mg, Triamcinolone 0.1% cream  Deleted: Norco 5-325mg, Lorazepam 2mg q6h, Zofran-ODT 4mg, Paroxetine 20mg, Prochlorperazine 5mg, Raloxifene 60mg, Zolpidem CR   Changed:   Amlodipine 2.5mg -> 5mg  Famotidine 10mg -> 40mg    Allergies reviewed with patient and updates made in EHR: yes    Medication History Completed By: Juan J Pak RPH 8/2/2024 4:26 PM    PTA Med List   Medication Sig Last Dose    albuterol (PROAIR HFA/PROVENTIL HFA/VENTOLIN HFA) 108 (90 Base) MCG/ACT inhaler Inhale 2 puffs into the lungs every 6 hours as needed for shortness of breath, wheezing or cough  at PRN    amLODIPine (NORVASC) 5 MG tablet Take 5 mg by mouth daily 8/2/2024    calcium carbonate-vitamin D (CALTRATE) 600-10 MG-MCG per tablet Take 1 tablet by mouth daily 8/2/2024    escitalopram (LEXAPRO) 5 MG tablet Take 2.5 mg by mouth daily 8/2/2024    famotidine (PEPCID) 40 MG tablet Take 40 mg by mouth 2 times daily 8/2/2024    LORazepam (ATIVAN) 2 MG tablet Take 0.5 tablets by mouth nightly as needed for anxiety. 8/1/2024    triamcinolone (KENALOG) 0.1 % external cream Apply topically 2 times daily as needed for irritation  at PRN

## 2024-08-02 NOTE — ED NOTES
[facility]  ED Nurse Handoff Report    ED Chief complaint: Extremity Weakness  . ED Diagnosis:   Final diagnoses:   Right sided numbness   Accelerated hypertension       Allergies: No Known Allergies    Code Status: Full Code    Activity level - Baseline/Home:  independent.  Activity Level - Current:   independent.   Lift room needed: No.   Bariatric: No   Needed: No   Isolation: No.   Infection: Not Applicable.     Respiratory status: Room air    Vital Signs (within 30 minutes):   Vitals:    08/02/24 1514 08/02/24 1517 08/02/24 1530 08/02/24 1635   BP: (!) 156/122  (!) 174/77 (!) 164/77   Pulse: 71 70 60 66   Resp:       Temp:       TempSrc:       SpO2: 98% 98% 97% 98%   Weight:       Height:           Cardiac Rhythm:  ,      Pain level:    Patient confused: No.   Patient Falls Risk: nonskid shoes/slippers when out of bed.   Elimination Status: Has voided     Patient Report - Initial Complaint: Right sided weakness  Focused Assessment: Hoa Britt is a 69 year old female who presents for evaluation of extremity weakness. Patient reports that she last night she took half a benadryl to help her sleep before going to bed around 2200. Then this morning she woke groggily at 0700 and immediately upon getting out of bed she became unsteady on her feet and kept falling over. She endorses weakness in her right arm as well as tingling that is also present in her right leg. She also reports 5/10 headache, neck pain, dizziness, and new blurred vision. Patient denies any recent trauma. She is not anticoagulated. Pt is a retired RN.     Abnormal Results:   Labs Ordered and Resulted from Time of ED Arrival to Time of ED Departure   BASIC METABOLIC PANEL - Abnormal       Result Value    Sodium 133 (*)     Potassium 4.2      Chloride 96 (*)     Carbon Dioxide (CO2) 23      Anion Gap 14      Urea Nitrogen 11.9      Creatinine 0.58      GFR Estimate >90      Calcium 9.6      Glucose 116 (*)     GLUCOSE BY METER - Abnormal    GLUCOSE BY METER POCT 118 (*)    INR - Normal    INR 0.94     PARTIAL THROMBOPLASTIN TIME - Normal    aPTT 29     TROPONIN T, HIGH SENSITIVITY - Normal    Troponin T, High Sensitivity 10     CBC WITH PLATELETS AND DIFFERENTIAL    WBC Count 8.7      RBC Count 4.47      Hemoglobin 13.3      Hematocrit 39.4      MCV 88      MCH 29.8      MCHC 33.8      RDW 13.8      Platelet Count 369      % Neutrophils 54      % Lymphocytes 33      % Monocytes 8      % Eosinophils 4      % Basophils 1      % Immature Granulocytes 0      NRBCs per 100 WBC 0      Absolute Neutrophils 4.7      Absolute Lymphocytes 2.9      Absolute Monocytes 0.7      Absolute Eosinophils 0.3      Absolute Basophils 0.1      Absolute Immature Granulocytes 0.0      Absolute NRBCs 0.0     GLUCOSE MONITOR NURSING POCT        MR Brain w/o & w Contrast   Final Result   Impression:   1. No acute intracranial pathology.   2. Mild generalized cerebral volume loss and chronic small vessel   ischemic disease.   2. 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      SIMON KAPOOR MD            SYSTEM ID:  [id number]      CT Head Perfusion w Contrast - For Tier 2 Stroke   Final Result   IMPRESSION:     1. Head CTA demonstrates patent major intracranial arteries without   large vessel occlusion, high-grade stenosis or aneurysm.    2. Neck CTA demonstrates patent major cervical arteries. Mild to   moderate stenosis at the origin of the right vertebral artery. Mixed   calcified and soft plaque at the right ICA bifurcation without   significant stenosis.   3. Head CT perfusion demonstrates no evidence of ischemia or infarct.      Findings were discussed with Dr. Hodges at 15:04 hours CDT.      SIMON KAPOOR MD            SYSTEM ID:  [id number]      CTA Head Neck with Contrast   Final Result   IMPRESSION:     1. Head CTA demonstrates patent major intracranial arteries without   large vessel  occlusion, high-grade stenosis or aneurysm.    2. Neck CTA demonstrates patent major cervical arteries. Mild to   moderate stenosis at the origin of the right vertebral artery. Mixed   calcified and soft plaque at the right ICA bifurcation without   significant stenosis.   3. Head CT perfusion demonstrates no evidence of ischemia or infarct.      Findings were discussed with Dr. Hodges at 15:04 hours CDT.      ISMON KAPOOR MD            SYSTEM ID:  [id number]      CT Head w/o Contrast   Final Result   IMPRESSION:    1. No acute intracranial hemorrhage or no definite gray-white matter   differentiation loss.   2. Relative hypointensity in the left perirolandic region (series 3,   image 22). This is not well-seen on sagittal and coronal images and   can be artifactual.      Findings were discussed with at 15:04 hours CDT.       SIMON KAPOOR MD            SYSTEM ID:  [id number]          Treatments provided: Plavix, Asa,   Family Comments: N/A  OBS brochure/video discussed/provided to patient:  Yes  ED Medications:   Medications   iopamidol (ISOVUE-370) solution 117 mL (117 mLs Intravenous $Given 8/2/24 1446)     And   sodium chloride 0.9 % bag for CT scan flush use (95 mLs As instructed $Given 8/2/24 1447)   aspirin (ASA) chewable tablet 324 mg (324 mg Oral $Given 8/2/24 1528)   clopidogrel (PLAVIX) tablet 300 mg (300 mg Oral $Given 8/2/24 1528)   gadobutrol (GADAVIST) injection 6 mL (6 mLs Intravenous $Given 8/2/24 1605)   LORazepam (ATIVAN) injection 0.5 mg (0.5 mg Intravenous $Given 8/2/24 1550)       Drips infusing:  No  For the majority of the shift this patient was Green.   Interventions performed were N/A.    Sepsis treatment initiated: No    Cares/treatment/interventions/medications to be completed following ED care: Neuro checks.     ED Nurse Name: Venus Manriquez RN  4:54 PM

## 2024-08-02 NOTE — H&P
[facility]    History and Physical - Hospitalist Service       Date of Admission:  8/2/2024    Assessment & Plan      Hoa Britt is a 69 year old female admitted on 8/2/2024. She has a history of hypertension, chronic neck pain, prior cervical neck surgeries, generalized anxiety disorder presents the ED for evaluation of right-sided numbness.  Awoke with numbness of right side of face, arm, and leg morning of admission.  Subjectively feels weak but no objective weakness present.  CT stroke series and MRI negative for intracranial pathology or large vessel occlusion/stenosis.  Admitted to observation, stroke neurology following.    Right-sided face, arm, leg numbness  Headache  Woke up with symptoms present this morning at 7:00 AM.  Numbness of the right side of the face, arm, leg.  Reporting subjective weakness although no objective weakness appreciated on exam.  Also endorsing headache, hypertensive on arrival with /99.  Initial concern for stroke, although CT stroke series and subsequent MRI negative for intracranial pathology, large arteries are all patent.  Stroke neurology recommending admission, ASA and clopidogrel loading, standard ischemic stroke admit, however will see if there are recommendations change given the negative head imaging.  - Stroke neurology consulted  - Admit to observation  - S/p loading with ASA, clopidogrel  - Start ASA 81 mg, clopidogrel daily tomorrow  - TTE ordered  - Follow-up A1c, lipid panel  - Permissive hypertension for now, goal SBP <220 mg mercury  - Neurochecks, vital signs every 4 hours  - PT/OT ordered    Hypertension  Takes amlodipine for BP control PTA.  Will hold for now with permissive hypertension overnight.    Chronic neck pain  Hx cervical neck surgery  Noted per chart review.  Would be surprised if this was related to patient's current presentation, however stroke workup negative so far.    Generalized anxiety disorder: Continue PTA  escitalopram, lorazepam on hold for accurate neuroassessments with stroke rule out.          Diet: NPO for Medical/Clinical Reasons Except for: No Exceptions  NPO for Medical/Clinical Reasons Except for: Meds, Ice Chips    DVT Prophylaxis: Pneumatic Compression Devices  Lance Catheter: Not present  Lines: None     Cardiac Monitoring: ACTIVE order. Indication: Stroke, acute (48 hours)  Code Status: Full Code    Clinically Significant Risk Factors Present on Admission                  # Hypertension: Noted on problem list                    Disposition Plan     Medically Ready for Discharge: Anticipated Tomorrow           Zohaib Myers MD  Hospitalist Service  [facility]  Securely message with [employer] (more info)  Text page via [facility] Paging/Directory     ______________________________________________________________________    Chief Complaint   Numbness of right face, arm, leg    History is obtained from the patient    History of Present Illness   Hoa Britt is a 69 year old female who has a history of hypertension, JIMENEZ, chronic neck pain with prior cervical neck surgeries presents to ED for evaluation of numbness to the right side of body.  Awoke at 7 AM this morning with numbness of the right face, arm, and leg.  Subjectively feeling weak, although able to ambulate and use her right arm.  Has never had anything like this before.  Is endorsing a headache, but otherwise feeling in her general state of health.  Has been taking her medications as prescribed, no recent changes.  No recent illness symptoms.  Denies fevers, chills, cough, changes in bowel or bladder habits.    In the ED was hypertensive with initial SBP >200.  Now in the 160s-170s.  Hemodynamically stable otherwise.  Labs notable for mild hyponatremia to 133, otherwise completely unremarkable.  CT stroke series of the head and MRI brain patent intracranial arteries and no acute intracranial pathology.  Stroke neurology was  consulted and recommended admission.      Past Medical History    No past medical history on file.    Past Surgical History   Past Surgical History:   Procedure Laterality Date    COLECTOMY RIGHT  2/3/2012    Procedure:COLECTOMY RIGHT; Colectomy right; Surgeon:BRIAN ZAPATA; Location:[facility] OR    COLONOSCOPY  4/16/2012    Procedure:COLONOSCOPY; colonoscopy; Surgeon:ROJELIO RAHMAN; Location: GI    ESOPHAGOSCOPY, GASTROSCOPY, DUODENOSCOPY (EGD), COMBINED N/A 6/3/2021    Procedure: ESOPHAGOGASTRODUODENOSCOPY, WITH BIOPSIES USING BIOPSY FORCEP;  Surgeon: Ramirez Art MD;  Location:  GI    HERNIA REPAIR  2016    LAPAROTOMY EXPLORATORY  3/1/2012    Procedure:LAPAROTOMY EXPLORATORY; Exploratory Laparotomy, Small Bowel Resection, Lysis of adhesions; Surgeon:BRIAN ZAPATA; Location:[facility] OR    LAPAROTOMY EXPLORATORY  3/12/2012    Procedure:LAPAROTOMY EXPLORATORY; Reclosure of abdominal Incision with Wound Vac placement; Surgeon:BRIAN ZAPATA; Location:[facility] OR    RESECT SMALL BOWEL WITHOUT OSTOMY  3/1/2012    Procedure:RESECT SMALL BOWEL WITHOUT OSTOMY; Surgeon:BRIAN ZAPATA; Location:[facility] OR       Prior to Admission Medications   Prior to Admission Medications   Prescriptions Last Dose Informant Patient Reported? Taking?   LORazepam (ATIVAN) 2 MG tablet 8/1/2024  No Yes   Sig: Take 0.5 tablets by mouth nightly as needed for anxiety.   albuterol (PROAIR HFA/PROVENTIL HFA/VENTOLIN HFA) 108 (90 Base) MCG/ACT inhaler  at PRN  Yes Yes   Sig: Inhale 2 puffs into the lungs every 6 hours as needed for shortness of breath, wheezing or cough   amLODIPine (NORVASC) 5 MG tablet 8/2/2024  Yes Yes   Sig: Take 5 mg by mouth daily   calcium carbonate-vitamin D (CALTRATE) 600-10 MG-MCG per tablet 8/2/2024  Yes Yes   Sig: Take 1 tablet by mouth daily   escitalopram (LEXAPRO) 5 MG tablet 8/2/2024  Yes Yes   Sig: Take 2.5 mg by mouth daily   famotidine (PEPCID) 40 MG tablet 8/2/2024  Yes Yes   Sig: Take 40 mg by mouth 2 times daily    triamcinolone (KENALOG) 0.1 % external cream  at PRN  Yes Yes   Sig: Apply topically 2 times daily as needed for irritation      Facility-Administered Medications: None        Review of Systems    The 10 point Review of Systems is negative other than noted in the HPI or here.      Physical Exam   Vital Signs: Temp: 98.1  F (36.7  C) Temp src: Oral BP: (!) 164/77 Pulse: 66   Resp: 18 SpO2: 98 %      Weight: 138 lbs 7.18 oz    Constitutional: Well-appearing, no acute distress, lying in bed  Respiratory: Normal WOB, lungs clear  Cardiovascular: RRR, no MRGs, no peripheral edema  GI: Abdomen soft, non-tender, non-distended  Neurologic: AAOx3, mentating clearly. Endorsing ongoing but improved numbness of R side of face, R arm, and R leg. Strength intact and 5/5 symmetrically in upper and lower extremities. Cranial nerves grossly intact with no lateralizing weakness appreciated.    Medical Decision Making       67 MINUTES SPENT BY ME on the date of service doing chart review, history, exam, documentation & further activities per the note.      Data     I have personally reviewed the following data over the past 24 hrs:    8.7  \   13.3   / 369     133 (L) 96 (L) 11.9 /  118 (H)   4.2 23 0.58 \     Trop: 10 BNP: N/A     INR:  0.94 PTT:  29   D-dimer:  N/A Fibrinogen:  N/A       Imaging results reviewed over the past 24 hrs:   Recent Results (from the past 24 hour(s))   CT Head w/o Contrast    Narrative    EXAM: CT HEAD W/O CONTRAST  8/2/2024 2:50 PM     HISTORY:  Code Stroke to evaluate for potential thrombolysis and  thrombectomy. PLEASE READ IMMEDIATELY.       COMPARISON:  No prior similar studies    TECHNIQUE: Using multidetector thin collimation helical acquisition  technique, axial, coronal and sagittal CT images from the skull base  to the vertex were obtained without intravenous contrast.   (topogram) image(s) also obtained and reviewed. Dose reduction  techniques were performed.    FINDINGS:  No intracranial  hemorrhage, mass effect, or midline shift. No definite  gray-white matter differentiation loss. Relative hypointensity in the  left perirolandic region (series 3, image 22). This is not well-seen  on sagittal and coronal images and can be artifactual. Ventricles are  proportionate to the cerebral sulci. Clear basal cisterns.    The bony calvaria and the bones of the skull base are normal. The  visualized portions of the paranasal sinuses and mastoid air cells are  clear. Grossly normal orbits.       Impression    IMPRESSION:   1. No acute intracranial hemorrhage or no definite gray-white matter  differentiation loss.  2. Relative hypointensity in the left perirolandic region (series 3,  image 22). This is not well-seen on sagittal and coronal images and  can be artifactual.    Findings were discussed with at 15:04 hours CDT.     SIMON KAPOOR MD         SYSTEM ID:  [id number]   CTA Head Neck with Contrast    Narrative    EXAM: CTA HEAD NECK W CONTRAST, CT HEAD PERFUSION W CONTRAST  8/2/2024  2:55 PM     HISTORY:  Code Stroke to evaluate for potential thrombolysis and  thrombectomy. PLEASE READ IMMEDIATELY.       COMPARISON:  Head CT same-day    TECHNIQUE:    HEAD AND NECK CTA: During rapid bolus intravenous injection of  nonionic contrast material, axial images were obtained using thin  collimation multidetector helical technique from the base of the upper  aortic arch through the [place] of [place]. This CT angiogram data was  reconstructed at thin intervals with mild overlap. Images were sent to  the 3D workstation, and 3D reconstructions were obtained. The axial  source images, multiplanar reformations, 3D reconstructions in both  maximum intensity projection display and volume rendered models were  reviewed, with reconstructions performed by the technologist and the  radiologist.  CT PERFUSION: Dynamic perfusion CT of the brain was performed at  multiple levels; perfusion was measured and imaged during rapid  bolus  intravenous injection of nonionic iodinated contrast medium. Images  were post-processed, reconstructed, and reviewed.     CONTRAST: 67mL Isovue-370 (accession [id number]), 50mL Isovue-370  (accession [id number])    FINDINGS:    Head CTA demonstrates no intracranial arterial aneurysm or stenosis.    Neck CTA demonstrates patent major cervical arteries. Mild to moderate  focal stenosis at the origin of the right vertebral artery secondary  to soft plaque. Mixed calcified and soft plaque at the right ICA  bifurcation without high-grade stenosis. Patent and conventional  aortic arch branching pattern.    No acute finding in the visualized neck soft tissues, or in the  superior mediastinum/thorax. 6 mm hypodense nodule in the right  thyroid nodule.    CT perfusion demonstrates no evidence of ischemia or acute infarction.  There is normal perfusion of the brain at the visualized levels.       Impression    IMPRESSION:    1. Head CTA demonstrates patent major intracranial arteries without  large vessel occlusion, high-grade stenosis or aneurysm.   2. Neck CTA demonstrates patent major cervical arteries. Mild to  moderate stenosis at the origin of the right vertebral artery. Mixed  calcified and soft plaque at the right ICA bifurcation without  significant stenosis.  3. Head CT perfusion demonstrates no evidence of ischemia or infarct.    Findings were discussed with Dr. Hodges at 15:04 hours CDT.    SIMON KAPOOR MD         SYSTEM ID:  [id number]   CT Head Perfusion w Contrast - For Tier 2 Stroke    Narrative    EXAM: CTA HEAD NECK W CONTRAST, CT HEAD PERFUSION W CONTRAST  8/2/2024  2:55 PM     HISTORY:  Code Stroke to evaluate for potential thrombolysis and  thrombectomy. PLEASE READ IMMEDIATELY.       COMPARISON:  Head CT same-day    TECHNIQUE:    HEAD AND NECK CTA: During rapid bolus intravenous injection of  nonionic contrast material, axial images were obtained using thin  collimation multidetector helical  technique from the base of the upper  aortic arch through the [place] of [place]. This CT angiogram data was  reconstructed at thin intervals with mild overlap. Images were sent to  the 3D workstation, and 3D reconstructions were obtained. The axial  source images, multiplanar reformations, 3D reconstructions in both  maximum intensity projection display and volume rendered models were  reviewed, with reconstructions performed by the technologist and the  radiologist.  CT PERFUSION: Dynamic perfusion CT of the brain was performed at  multiple levels; perfusion was measured and imaged during rapid bolus  intravenous injection of nonionic iodinated contrast medium. Images  were post-processed, reconstructed, and reviewed.     CONTRAST: 67mL Isovue-370 (accession [id number]), 50mL Isovue-370  (accession [id number])    FINDINGS:    Head CTA demonstrates no intracranial arterial aneurysm or stenosis.    Neck CTA demonstrates patent major cervical arteries. Mild to moderate  focal stenosis at the origin of the right vertebral artery secondary  to soft plaque. Mixed calcified and soft plaque at the right ICA  bifurcation without high-grade stenosis. Patent and conventional  aortic arch branching pattern.    No acute finding in the visualized neck soft tissues, or in the  superior mediastinum/thorax. 6 mm hypodense nodule in the right  thyroid nodule.    CT perfusion demonstrates no evidence of ischemia or acute infarction.  There is normal perfusion of the brain at the visualized levels.       Impression    IMPRESSION:    1. Head CTA demonstrates patent major intracranial arteries without  large vessel occlusion, high-grade stenosis or aneurysm.   2. Neck CTA demonstrates patent major cervical arteries. Mild to  moderate stenosis at the origin of the right vertebral artery. Mixed  calcified and soft plaque at the right ICA bifurcation without  significant stenosis.  3. Head CT perfusion demonstrates no evidence of ischemia  or infarct.    Findings were discussed with Dr. Hodges at 15:04 hours CDT.    SIMON KAPOOR MD         SYSTEM ID:  [id number]   MR Brain w/o & w Contrast    Narrative     MR BRAIN W/O & W CONTRAST 8/2/2024 4:28 PM    Provided History: stroke symptoms.    Comparison: CT and CTA same-day.    Technique: Multiplanar multisequence brain MRI without and with  contrast.     Contrast: 6mL Gadavist     Findings:  There is no mass effect, midline shift, or evidence of intracranial  hemorrhage. The ventricles are proportionate to the cerebral sulci.  Normal major vascular intracranial flow-voids. Mild leukoaraiosis. 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    Postcontrast images demonstrate no abnormal intracranial enhancement.    No abnormality of the skull marrow signal. The visualized portions of  paranasal sinuses, and mastoid air cells are relatively clear. The  orbits are grossly unremarkable.      Impression    Impression:  1. No acute intracranial pathology.  2. Mild generalized cerebral volume loss and chronic small vessel  ischemic disease.  2. 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    SIMON KAPOOR MD         SYSTEM ID:  [id number]

## 2024-08-02 NOTE — ED TRIAGE NOTES
Patient reports a headache and numbness on her left side that started this morning when she woke up.      Triage Assessment (Adult)       Row Name 08/02/24 4057          Triage Assessment    Airway WDL WDL        Respiratory WDL    Respiratory WDL WDL        Skin Circulation/Temperature WDL    Skin Circulation/Temperature WDL WDL        Cardiac WDL    Cardiac WDL WDL        Peripheral/Neurovascular WDL    Peripheral Neurovascular WDL WDL

## 2024-08-02 NOTE — CONSULTS
[facility]    Stroke Telephone Note    I was called by Yves Hodges on 08/02/24 regarding patient Hoa Britt. The patient is a 69 year old female with PMH HTN, chronic neck pain with prior cervical spine surgery, anxiety. Woke up this morning at 0700 with R sided numbness (face, arm and leg), subjectively feels weak on the R but no objective weakness on ED provider exam. Also has a moderate HA, neck pain and feels unsteady on her feet. Presenting /99.       Vitals  BP: (!) 200/99   Pulse: 79   Resp: 18   Temp: 98.1  F (36.7  C)   Weight: 62.8 kg (138 lb 7.2 oz)    Stroke Code Data (for stroke code without tele)  Stroke code activated 08/02/24  1445   Stroke provider first response 08/02/24  1447   Last known normal 08/01/24  2200 (bedtime)      Time of discovery (or onset of symptoms) 08/02/24  0700   Head CT read by Stroke Neuro Provider 08/02/24  7947   Was stroke code de-escalated? Yes  08/02/24  1514     Imaging Findings  CT head: 1. No acute intracranial hemorrhage or no definite gray-white matter  differentiation loss.  2. Relative hypointensity in the left perirolandic region (series 3, image 22). This is not well-seen on sagittal and coronal images and can be artifactual.    CTA head/neck: 1. Head CTA demonstrates patent major intracranial arteries without  large vessel occlusion, high-grade stenosis or aneurysm.   2. Neck CTA demonstrates patent major cervical arteries. Mild to  moderate stenosis at the origin of the right vertebral artery. Mixed  calcified and soft plaque at the right ICA bifurcation without  significant stenosis.  3. Head CT perfusion demonstrates no evidence of ischemia or infarct.    CTP: 1. Head CTA demonstrates patent major intracranial arteries without  large vessel occlusion, high-grade stenosis or aneurysm.   2. Neck CTA demonstrates patent major cervical arteries. Mild to  moderate stenosis at the origin of the right vertebral artery.  "Mixed  calcified and soft plaque at the right ICA bifurcation without  significant stenosis.  3. Head CT perfusion demonstrates no evidence of ischemia or infarct.       Intravenous Thrombolysis  Not given due to:   - unclear or unfavorable risk-benefit profile for extended window thrombolysis beyond the conventional 4.5 hour time window    Endovascular Treatment  Not initiated due to absence of proximal vessel occlusion    Impression  Suspected acute ischemic stroke with symptoms of R sided numbness and gait unsteadiness - MRI pending    Recommendations   - Use orderset: \"Ischemic Stroke Routine Admission\" or \"Ischemic Stroke No Thrombolytics/No Thrombectomy ICU Admission\"  - Place Neurology IP Stroke Consult order  - will plan to see for telestroke consult tomorrow after completion of MRI  - Neurochecks and Vital Signs every 4 hours   - Permissive HTN; goal SBP < 220 mmHg  - Load with DAPT now: aspirin 325 mg + Plavix 300 mg, start aspirin 81 mg + Plavix 75 mg tomorrow x21 days followed by aspirin 81 mg daily monotherapy for secondary stroke prevention  - Statin: pending LDL  - MRI Brain with and without contrast   - TTE   - Telemetry, EKG  - Bedside Glucose Monitoring  - Nutrition: nursing to document bedside swallow prior to oral intake  - A1c, Lipid Panel, Troponin x 3  - PT/OT/SLP  - Stroke Education  - Depression Screen  - Apnea screening questions  - Euthermia, Euglycemia      Case discussed with vascular neurology attending Dr. Aleman.    My recommendations are based on the information provided over the phone by Hoa Britt's in-person providers. They are not intended to replace the clinical judgment of her in-person providers. I was not requested to personally see or examine the patient at this time.     Alla Lilly PA-C  Vascular Neurology    To page me or covering stroke neurology team member, click here: [facility]  Choose \"On Call\" tab at top, then select \"NEUROLOGY/ALL SITES\" from middle drop-down box, " "press Enter, then look for \"stroke\" or \"telestroke\" for your site.    "

## 2024-08-02 NOTE — ED PROVIDER NOTES
"  Emergency Department Note      History of Present Illness     Chief Complaint   Extremity Weakness      HPI   Hoa Britt is a 69 year old female who presents for evaluation of extremity weakness. Patient reports that she last night she took half a benadryl to help her sleep before going to bed around 2200. Then this morning she woke groggily at 0700 and immediately upon getting out of bed she became unsteady on her feet and kept falling over. She endorses weakness in her right arm as well as tingling that is also present in her right leg. She also reports 5/10 headache, neck pain, dizziness, and new blurred vision. Patient denies any recent trauma. She is not anticoagulated.     Independent Historian   None    Review of External Notes   I reviewed primary care office visit from 4/25/2024 when she was seen for chronic neck issues.    Past Medical History     Medical History and Problem List   Volvulus  Hypotension  Small bowel obstruction  GERD  Depression  Ventral hernia  Age related osteoporosis  Psychological insomnia  Right hip pain  Anxiety  Presbyesophagus       Medications   Amlodipine  Pepcid  Norco  Ativan  Antivert  Paxil  Zofran  Compazine  Evista  Ambien      Surgical History   Right colectomy  Colonoscopy  Hernia rpair  Laparotomy exploratory x2  Resect small bowel without ostomy   Esophagogastroduodenoscopy       Physical Exam     Patient Vitals for the past 24 hrs:   BP Temp Temp src Pulse Resp SpO2 Height Weight   08/02/24 2103 134/57 97.9  F (36.6  C) Oral -- -- 96 % -- --   08/02/24 1815 (!) 164/83 97.9  F (36.6  C) Oral 65 18 95 % -- --   08/02/24 1635 (!) 164/77 -- -- 66 -- 98 % -- --   08/02/24 1530 (!) 174/77 -- -- 60 -- 97 % -- --   08/02/24 1517 -- -- -- 70 -- 98 % -- --   08/02/24 1514 (!) 156/122 -- -- 71 -- 98 % -- --   08/02/24 1502 (!) 186/113 -- -- 73 -- 98 % -- --   08/02/24 1431 (!) 200/99 98.1  F (36.7  C) Oral 79 18 99 % 1.651 m (5' 5\") 62.8 kg (138 lb 7.2 oz)     Physical " Exam  Vitals and nursing note reviewed.   Constitutional:       General: She is not in acute distress.     Appearance: She is not ill-appearing.   HENT:      Head: Normocephalic and atraumatic.      Right Ear: External ear normal.      Left Ear: External ear normal.      Nose: Nose normal.      Mouth/Throat:      Mouth: Mucous membranes are moist.   Eyes:      General: No visual field deficit.     Extraocular Movements: Extraocular movements intact.      Conjunctiva/sclera: Conjunctivae normal.      Pupils: Pupils are equal, round, and reactive to light.   Cardiovascular:      Rate and Rhythm: Normal rate and regular rhythm.      Heart sounds: No murmur heard.  Pulmonary:      Effort: Pulmonary effort is normal. No respiratory distress.      Breath sounds: Normal breath sounds. No wheezing, rhonchi or rales.   Abdominal:      General: Abdomen is flat. Bowel sounds are normal. There is no distension.      Palpations: Abdomen is soft.      Tenderness: There is no abdominal tenderness. There is no guarding or rebound.   Musculoskeletal:         General: No deformity or signs of injury.      Cervical back: Normal range of motion and neck supple.   Skin:     General: Skin is warm and dry.      Findings: No rash.   Neurological:      Mental Status: She is alert and oriented to person, place, and time.      Cranial Nerves: No dysarthria or facial asymmetry.      Sensory: Sensory deficit (decreased sensation to LT in R face, RUE, RLE) present.      Motor: No weakness or pronator drift.   Psychiatric:         Mood and Affect: Mood normal.         Behavior: Behavior normal.           Diagnostics     Lab Results   Labs Ordered and Resulted from Time of ED Arrival to Time of ED Departure   BASIC METABOLIC PANEL - Abnormal       Result Value    Sodium 133 (*)     Potassium 4.2      Chloride 96 (*)     Carbon Dioxide (CO2) 23      Anion Gap 14      Urea Nitrogen 11.9      Creatinine 0.58      GFR Estimate >90      Calcium 9.6       Glucose 116 (*)    GLUCOSE BY METER - Abnormal    GLUCOSE BY METER POCT 118 (*)    INR - Normal    INR 0.94     PARTIAL THROMBOPLASTIN TIME - Normal    aPTT 29     TROPONIN T, HIGH SENSITIVITY - Normal    Troponin T, High Sensitivity 10     CBC WITH PLATELETS AND DIFFERENTIAL    WBC Count 8.7      RBC Count 4.47      Hemoglobin 13.3      Hematocrit 39.4      MCV 88      MCH 29.8      MCHC 33.8      RDW 13.8      Platelet Count 369      % Neutrophils 54      % Lymphocytes 33      % Monocytes 8      % Eosinophils 4      % Basophils 1      % Immature Granulocytes 0      NRBCs per 100 WBC 0      Absolute Neutrophils 4.7      Absolute Lymphocytes 2.9      Absolute Monocytes 0.7      Absolute Eosinophils 0.3      Absolute Basophils 0.1      Absolute Immature Granulocytes 0.0      Absolute NRBCs 0.0     GLUCOSE MONITOR NURSING POCT   GLUCOSE MONITOR NURSING POCT   GLUCOSE MONITOR NURSING POCT   LIPID PROFILE       Imaging   MR Brain w/o & w Contrast   Final Result   Impression:   1. No acute intracranial pathology.   2. Mild generalized cerebral volume loss and chronic small vessel   ischemic disease.   2. 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      SIMON KAPOOR MD            SYSTEM ID:  [id number]      CT Head Perfusion w Contrast - For Tier 2 Stroke   Final Result   IMPRESSION:     1. Head CTA demonstrates patent major intracranial arteries without   large vessel occlusion, high-grade stenosis or aneurysm.    2. Neck CTA demonstrates patent major cervical arteries. Mild to   moderate stenosis at the origin of the right vertebral artery. Mixed   calcified and soft plaque at the right ICA bifurcation without   significant stenosis.   3. Head CT perfusion demonstrates no evidence of ischemia or infarct.      Findings were discussed with Dr. Hodges at 15:04 hours CDT.      SIMON KAPOOR MD            SYSTEM ID:  [id number]      CTA Head Neck with Contrast   Final Result    IMPRESSION:     1. Head CTA demonstrates patent major intracranial arteries without   large vessel occlusion, high-grade stenosis or aneurysm.    2. Neck CTA demonstrates patent major cervical arteries. Mild to   moderate stenosis at the origin of the right vertebral artery. Mixed   calcified and soft plaque at the right ICA bifurcation without   significant stenosis.   3. Head CT perfusion demonstrates no evidence of ischemia or infarct.      Findings were discussed with Dr. Hodges at 15:04 hours CDT.      SIMON KAPOOR MD            SYSTEM ID:  [id number]      CT Head w/o Contrast   Final Result   IMPRESSION:    1. No acute intracranial hemorrhage or no definite gray-white matter   differentiation loss.   2. Relative hypointensity in the left perirolandic region (series 3,   image 22). This is not well-seen on sagittal and coronal images and   can be artifactual.      Findings were discussed with at 15:04 hours CDT.       SIMON KAPOOR MD            SYSTEM ID:  [id number]      Echocardiogram Complete - For age > 60 yrs    (Results Pending)       EKG   ECG results from 08/02/24   EKG 12-lead, tracing only     Value    Systolic Blood Pressure     Diastolic Blood Pressure     Ventricular Rate 61    Atrial Rate 61    ID Interval 178    QRS Duration 74        QTc 444    P Axis 54    R AXIS 55    T Axis 34    Interpretation ECG      Normal sinus rhythm  Nonspecific ST abnormality  When compared with ECG of 17-Feb-2013 17:12,  No significant change    Read by Yves Hodges MD at 1537         Independent Interpretation   CT Head: No intracranial hemorrhage.    ED Course      Medications Administered   Medications   albuterol (PROVENTIL HFA/VENTOLIN HFA) inhaler (has no administration in time range)   escitalopram (LEXAPRO) half-tab 2.5 mg (2.5 mg Oral Not Given 8/2/24 1858)   famotidine (PEPCID) tablet 40 mg (40 mg Oral Not Given 8/2/24 2106)   lidocaine 1 % 0.1-1 mL (has no administration in time range)    lidocaine (LMX4) cream (has no administration in time range)   sodium chloride (PF) 0.9% PF flush 3 mL (3 mLs Intracatheter $Given 8/2/24 1858)   sodium chloride (PF) 0.9% PF flush 3 mL (has no administration in time range)   aspirin EC tablet 81 mg (has no administration in time range)   clopidogrel (PLAVIX) tablet 75 mg (has no administration in time range)   acetaminophen (TYLENOL) tablet 650 mg (650 mg Oral $Given 8/2/24 2106)     Or   acetaminophen (TYLENOL) Suppository 650 mg ( Rectal See Alternative 8/2/24 2106)   melatonin tablet 3 mg (has no administration in time range)   hydrOXYzine HCl (ATARAX) tablet 10 mg (has no administration in time range)   iopamidol (ISOVUE-370) solution 117 mL (117 mLs Intravenous $Given 8/2/24 1446)     And   sodium chloride 0.9 % bag for CT scan flush use (95 mLs As instructed $Given 8/2/24 1447)   aspirin (ASA) chewable tablet 324 mg (324 mg Oral $Given 8/2/24 1528)   clopidogrel (PLAVIX) tablet 300 mg (300 mg Oral $Given 8/2/24 1528)   gadobutrol (GADAVIST) injection 6 mL (6 mLs Intravenous $Given 8/2/24 1605)   LORazepam (ATIVAN) injection 0.5 mg (0.5 mg Intravenous $Given 8/2/24 1550)       Procedures   Procedures     Discussion of Management   Stroke/Neuro, Dr. More  Admitting Hospitalist, Dr. Myers    ED Course   ED Course as of 08/02/24 2129   Fri Aug 02, 2024   [id number] I obtained patient history and performed a physical exam.        Additional Documentation  None    Medical Decision Making / Diagnosis     CMS Diagnoses: None    MIPS       None    MDM   Hoa Britt is a 69 year old female who presents with right-sided numbness and subjective weakness upon awaking this morning.  Highly concerning for an acute stroke.  Less likely peripheral neuropathy, radiculopathy, etc.  Given that she is within 24 hours, we did activate a tier 2 stroke code and discussed with stroke neurology.  CT imaging was negative but they recommend that she be admitted for further stroke  workup and MRI.  I discussed with hospitalist accepted admission.  She was given Plavix and aspirin per stroke neurology recommendations.    Disposition   The patient was admitted to the hospital under the care of Dr. Myers.    Diagnosis     ICD-10-CM    1. Right sided numbness  R20.0       2. Accelerated hypertension  I10            Discharge Medications   Current Discharge Medication List            Scribe Disclosure:  I, Hetal Alvarenga, am serving as a scribe at 3:13 PM on 8/2/2024 to document services personally performed by Yves Hodges MD based on my observations and the provider's statements to me.        Yves Hodges MD  08/02/24 8404

## 2024-08-03 ENCOUNTER — APPOINTMENT (OUTPATIENT)
Dept: CARDIOLOGY | Facility: CLINIC | Age: 69
End: 2024-08-03
Attending: STUDENT IN AN ORGANIZED HEALTH CARE EDUCATION/TRAINING PROGRAM
Payer: MEDICARE

## 2024-08-03 VITALS
WEIGHT: 138.45 LBS | RESPIRATION RATE: 18 BRPM | TEMPERATURE: 97.9 F | BODY MASS INDEX: 23.07 KG/M2 | HEIGHT: 65 IN | OXYGEN SATURATION: 97 % | DIASTOLIC BLOOD PRESSURE: 75 MMHG | SYSTOLIC BLOOD PRESSURE: 160 MMHG | HEART RATE: 58 BPM

## 2024-08-03 LAB
ANION GAP SERPL CALCULATED.3IONS-SCNC: 12 MMOL/L (ref 7–15)
BUN SERPL-MCNC: 10.8 MG/DL (ref 8–23)
CALCIUM SERPL-MCNC: 9.1 MG/DL (ref 8.8–10.4)
CHLORIDE SERPL-SCNC: 99 MMOL/L (ref 98–107)
CREAT SERPL-MCNC: 0.54 MG/DL (ref 0.51–0.95)
EGFRCR SERPLBLD CKD-EPI 2021: >90 ML/MIN/1.73M2
ERYTHROCYTE [DISTWIDTH] IN BLOOD BY AUTOMATED COUNT: 14 % (ref 10–15)
GLUCOSE BLDC GLUCOMTR-MCNC: 101 MG/DL (ref 70–99)
GLUCOSE BLDC GLUCOMTR-MCNC: 104 MG/DL (ref 70–99)
GLUCOSE BLDC GLUCOMTR-MCNC: 94 MG/DL (ref 70–99)
GLUCOSE SERPL-MCNC: 181 MG/DL (ref 70–99)
HCO3 SERPL-SCNC: 24 MMOL/L (ref 22–29)
HCT VFR BLD AUTO: 38.5 % (ref 35–47)
HGB BLD-MCNC: 12.7 G/DL (ref 11.7–15.7)
LVEF ECHO: NORMAL
MCH RBC QN AUTO: 29.7 PG (ref 26.5–33)
MCHC RBC AUTO-ENTMCNC: 33 G/DL (ref 31.5–36.5)
MCV RBC AUTO: 90 FL (ref 78–100)
PLATELET # BLD AUTO: 320 10E3/UL (ref 150–450)
POTASSIUM SERPL-SCNC: 4.1 MMOL/L (ref 3.4–5.3)
RBC # BLD AUTO: 4.27 10E6/UL (ref 3.8–5.2)
SODIUM SERPL-SCNC: 135 MMOL/L (ref 135–145)
WBC # BLD AUTO: 5.4 10E3/UL (ref 4–11)

## 2024-08-03 PROCEDURE — 999N000147 HC STATISTIC PT IP EVAL DEFER

## 2024-08-03 PROCEDURE — 85027 COMPLETE CBC AUTOMATED: CPT | Performed by: STUDENT IN AN ORGANIZED HEALTH CARE EDUCATION/TRAINING PROGRAM

## 2024-08-03 PROCEDURE — 80048 BASIC METABOLIC PNL TOTAL CA: CPT | Performed by: STUDENT IN AN ORGANIZED HEALTH CARE EDUCATION/TRAINING PROGRAM

## 2024-08-03 PROCEDURE — G0426 INPT/ED TELECONSULT50: HCPCS | Mod: G0 | Performed by: NURSE PRACTITIONER

## 2024-08-03 PROCEDURE — 93306 TTE W/DOPPLER COMPLETE: CPT | Mod: 26 | Performed by: INTERNAL MEDICINE

## 2024-08-03 PROCEDURE — 250N000013 HC RX MED GY IP 250 OP 250 PS 637: Performed by: STUDENT IN AN ORGANIZED HEALTH CARE EDUCATION/TRAINING PROGRAM

## 2024-08-03 PROCEDURE — 99239 HOSP IP/OBS DSCHRG MGMT >30: CPT | Performed by: PHYSICIAN ASSISTANT

## 2024-08-03 PROCEDURE — G0378 HOSPITAL OBSERVATION PER HR: HCPCS

## 2024-08-03 PROCEDURE — 36415 COLL VENOUS BLD VENIPUNCTURE: CPT | Performed by: STUDENT IN AN ORGANIZED HEALTH CARE EDUCATION/TRAINING PROGRAM

## 2024-08-03 PROCEDURE — 93306 TTE W/DOPPLER COMPLETE: CPT

## 2024-08-03 PROCEDURE — 82962 GLUCOSE BLOOD TEST: CPT

## 2024-08-03 RX ORDER — ATORVASTATIN CALCIUM 20 MG/1
20 TABLET, FILM COATED ORAL EVERY EVENING
Status: DISCONTINUED | OUTPATIENT
Start: 2024-08-03 | End: 2024-08-03 | Stop reason: HOSPADM

## 2024-08-03 RX ORDER — ASPIRIN 81 MG/1
81 TABLET ORAL DAILY
Qty: 30 TABLET | Refills: 0 | Status: SHIPPED | OUTPATIENT
Start: 2024-08-04

## 2024-08-03 RX ORDER — ATORVASTATIN CALCIUM 20 MG/1
20 TABLET, FILM COATED ORAL EVERY EVENING
Qty: 30 TABLET | Refills: 0 | Status: SHIPPED | OUTPATIENT
Start: 2024-08-03

## 2024-08-03 RX ORDER — ACETAMINOPHEN 325 MG/1
650 TABLET ORAL EVERY 4 HOURS PRN
COMMUNITY
Start: 2024-08-03

## 2024-08-03 RX ADMIN — ASPIRIN 81 MG: 81 TABLET, COATED ORAL at 08:11

## 2024-08-03 RX ADMIN — FAMOTIDINE 40 MG: 20 TABLET ORAL at 08:11

## 2024-08-03 RX ADMIN — CLOPIDOGREL BISULFATE 75 MG: 75 TABLET ORAL at 08:11

## 2024-08-03 RX ADMIN — ESCITALOPRAM OXALATE 2.5 MG: 5 TABLET, FILM COATED ORAL at 08:11

## 2024-08-03 ASSESSMENT — ACTIVITIES OF DAILY LIVING (ADL)
ADLS_ACUITY_SCORE: 21
ADLS_ACUITY_SCORE: 20
ADLS_ACUITY_SCORE: 21
ADLS_ACUITY_SCORE: 21
ADLS_ACUITY_SCORE: 20
ADLS_ACUITY_SCORE: 20
ADLS_ACUITY_SCORE: 21
ADLS_ACUITY_SCORE: 21
DEPENDENT_IADLS:: INDEPENDENT
ADLS_ACUITY_SCORE: 20

## 2024-08-03 NOTE — PLAN OF CARE
"Goal Outcome Evaluation:      Plan of Care Reviewed With: patient      PRIMARY DIAGNOSIS: RIGHT SIDED WEAKNESS  OUTPATIENT/OBSERVATION GOALS TO BE MET BEFORE DISCHARGE:  ADLs back to baseline: Yes    Activity and level of assistance: Ambulating independently.    Pain status: Pain free.    Return to near baseline physical activity: Yes     Discharge Planner Nurse   Safe discharge environment identified: Yes  Barriers to discharge: Yes       Entered by: Debi Dean RN 08/03/2024 1:24 PM   Pt A/Ox4, VSS on RA, hypertensive. IV SL. Up independent to bathroom. Tele stroke video call scheduled for 0900. Echo pending. Possible discharge home.   Please review provider order for any additional goals.   Nurse to notify provider when observation goals have been met and patient is ready for discharge.        Problem: Adult Inpatient Plan of Care  Goal: Plan of Care Review  Description: The Plan of Care Review/Shift note should be completed every shift.  The Outcome Evaluation is a brief statement about your assessment that the patient is improving, declining, or no change.  This information will be displayed automatically on your shift  note.  Outcome: Progressing  Flowsheets (Taken 8/3/2024 1323)  Plan of Care Reviewed With: patient  Goal: Patient-Specific Goal (Individualized)  Description: You can add care plan individualizations to a care plan. Examples of Individualization might be:  \"Parent requests to be called daily at 9am for status\", \"I have a hard time hearing out of my right ear\", or \"Do not touch me to wake me up as it startles  me\".  Outcome: Progressing  Goal: Absence of Hospital-Acquired Illness or Injury  Outcome: Progressing  Intervention: Identify and Manage Fall Risk  Recent Flowsheet Documentation  Taken 8/3/2024 0900 by Debi Dean RN  Safety Promotion/Fall Prevention: safety round/check completed  Goal: Optimal Comfort and Wellbeing  Outcome: Progressing  Goal: Readiness for Transition of " Care  Outcome: Progressing     Problem: Fall Injury Risk  Goal: Absence of Fall and Fall-Related Injury  Outcome: Progressing  Intervention: Identify and Manage Contributors  Recent Flowsheet Documentation  Taken 8/3/2024 0900 by Debi Dean RN  Medication Review/Management: medications reviewed  Intervention: Promote Injury-Free Environment  Recent Flowsheet Documentation  Taken 8/3/2024 0900 by Debi Dean, RN  Safety Promotion/Fall Prevention: safety round/check completed

## 2024-08-03 NOTE — CONSULTS
"[facility]    Stroke Consult Note    Reason for Consult: TIA    Chief Complaint: Extremity Weakness       HPI  Hoa Britt is a 69 year old female with past medical history significant for hypertension, chronic neck pain with prior cervical spine surgery, and anxiety.  She presented to the ED 8/2/2024 for evaluation of headache, neck pain, and right-sided paresthesias.  Presenting /99. She reports that when she awoke, she felt \"off\". She was leaning to the right, felt lightheaded/dizzy and disoriented. She felt generally weak, and improved somewhat after her coffee. Additionally, she reports some mild numbness in the entire right arm, without face or leg involvement. She occasionally experiences right arm numbness in the setting of chronic pain/arthritis in the neck and notes that she was having a flare of her neck pain yesterday. She also notes that she took half a Benadryl around 9 PM the night prior and does not frequently take this medication. MRI brain was negative for acute stroke.      Today on exam she reports mild lightheadedness when she moves her head or gets up too quickly. This is a chronic issue for her. She has not been regularly checking home BP, but reports it was 156/76 the morning of presentation.     TIA Evaluation Summarized    MRI and/or Head CT MRI brain negative for acute stroke. 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    Intracranial Vasculature No significant stenosis    Cervical Vasculature Mild to moderate stenosis at the origin of the right vertebral artery. Mixed calcified and soft plaque at the right ICA bifurcation without significant stenosis.     Echocardiogram PENDING   EKG/Telemetry sinus   Other Testing Not Applicable      LDL 8/2/2024: 135 mg/dL   A1C 8/2/2024: 5.4 %     Impression  1. Episode of transient lightheadedness, confusion, and right arm numbness.  MRI brain negative for acute stroke.  While TIA remains in the " "differential, right arm symptoms could also be explained by her chronic neck pain as she reports previous episodes of numbness in the setting of pain flares.  She was also noted to be significantly hypertensive (SBP in the 200s) on presentation.  Will focus on optimizing vascular risk factors.    2.  Mixed calcified and soft plaque at the right ICA bifurcation without significant stenosis, this would not explain her symptoms.  Vascular risk factor management as below.    Recommendations   Secondary Prevention  - No strong indication for continuation of Plavix, can continue ASA 81 mg daily  - , recommend initiation of atorvastatin 20 mg daily with goal LDL <100  - A1c 5.4, within goal <7.0  - Long term goal BP <130/80 with tighter control associated with decreased overall CV risk, if tolerated. Discussed the importance of home BP monitoring and keeping a log for PCP.  - PT/OT/SLP, as needed  - Stroke education. Discussed stroke warning signs (BE FAST) and need for emergent presentation if symptoms occur    Diagnostic Evaluation  - Discharge with 14 day Ziopatch to screen for atrial fibrillation (ordered)  - TTE pending    Patient Follow-up    - in the next 1-2 week(s) with PCP  - in 6-8 weeks with general neurology ([phone])    Thank you for this consult.  We will follow peripherally for results of TTE and then sign off.    Odette Amador, CNP  Vascular Neurology    To page me or covering stroke neurology team member, click here: [facility]  Choose \"On Call\" tab at top, then select \"NEUROLOGY/ALL SITES\" from middle drop-down box, press Enter, then look for \"stroke\" or \"telestroke\" for your site.  _____________________________________________________    Clinically Significant Risk Factors Present on Admission                  # Hypertension: Noted on problem list                    Past Medical History    No past medical history on file.  Medications   Home Meds  Prior to Admission medications    Medication " Sig Start Date End Date Taking? Authorizing Provider   albuterol (PROAIR HFA/PROVENTIL HFA/VENTOLIN HFA) 108 (90 Base) MCG/ACT inhaler Inhale 2 puffs into the lungs every 6 hours as needed for shortness of breath, wheezing or cough   Yes Unknown, Entered By History   amLODIPine (NORVASC) 5 MG tablet Take 5 mg by mouth daily   Yes Reported, Patient   calcium carbonate-vitamin D (CALTRATE) 600-10 MG-MCG per tablet Take 1 tablet by mouth daily   Yes Unknown, Entered By History   escitalopram (LEXAPRO) 5 MG tablet Take 2.5 mg by mouth daily   Yes Unknown, Entered By History   famotidine (PEPCID) 40 MG tablet Take 40 mg by mouth 2 times daily   Yes Reported, Patient   LORazepam (ATIVAN) 2 MG tablet Take 0.5 tablets by mouth nightly as needed for anxiety. 4/17/12  Yes Nia Hardwick PA-C   triamcinolone (KENALOG) 0.1 % external cream Apply topically 2 times daily as needed for irritation   Yes Unknown, Entered By History       Scheduled Meds  Current Facility-Administered Medications   Medication Dose Route Frequency Provider Last Rate Last Admin    aspirin EC tablet 81 mg  81 mg Oral Daily Zohaib Myers MD   81 mg at 08/03/24 0811    atorvastatin (LIPITOR) tablet 20 mg  20 mg Oral QPM Odette Amador CNP        escitalopram (LEXAPRO) half-tab 2.5 mg  2.5 mg Oral Daily Zohaib Myers MD   2.5 mg at 08/03/24 0811    famotidine (PEPCID) tablet 40 mg  40 mg Oral BID Zohaib Myers MD   40 mg at 08/03/24 0811    sodium chloride (PF) 0.9% PF flush 3 mL  3 mL Intracatheter Q8H Zohaib Myers MD   3 mL at 08/03/24 0811       Infusion Meds  Current Facility-Administered Medications   Medication Dose Route Frequency Provider Last Rate Last Admin       Allergies   No Known Allergies       PHYSICAL EXAMINATION   Temp:  [97.8  F (36.6  C)-98.2  F (36.8  C)] 97.8  F (36.6  C)  Pulse:  [57-79] 60  Resp:  [18] 18  BP: (124-200)/() 148/62  SpO2:  [95 %-99 %] 96 %    Neuro Exam  Mental Status:  alert, oriented x 3,  follows commands, speech clear and fluent, naming and repetition normal  Cranial Nerves:  visual fields intact (tested by nurse), EOMI with normal smooth pursuit, facial sensation intact and symmetric (tested by nurse), facial movements symmetric, hearing not formally tested but intact to conversation, no dysarthria, shoulder shrug equal bilaterally, tongue protrusion midline  Motor:  no abnormal movements, able to move all limbs antigravity spontaneously with no signs of hemiparesis observed, no pronator drift  Reflexes:  unable to test (telestroke)  Sensory:  light touch sensation intact and symmetric throughout upper and lower extremities (assessed by nurse), no extinction on double simultaneous stimulation (assessed by nurse)  Coordination:  normal finger-to-nose and heel-to-shin bilaterally without dysmetria  Station/Gait:  unable to test due to telestroke    Stroke Scales    NIHSS  1a. Level of Consciousness 0-->Alert, keenly responsive   1b. LOC Questions 0-->Answers both questions correctly   1c. LOC Commands 0-->Performs both tasks correctly   2.   Best Gaze 0-->Normal   3.   Visual 0-->No visual loss   4.   Facial Palsy 0-->Normal symmetrical movements   5a. Motor Arm, Left 0-->No drift, limb holds 90 (or 45) degrees for full 10 secs   5b. Motor Arm, Right 0-->No drift, limb holds 90 (or 45) degrees for full 10 secs   6a. Motor Leg, Left 0-->No drift, leg holds 30 degree position for full 5 secs   6b. Motor Leg, right 0-->No drift, leg holds 30 degree position for full 5 secs   7.   Limb Ataxia 0-->Absent   8.   Sensory 0-->Normal, no sensory loss   9.   Best Language 0-->No aphasia, normal   10. Dysarthria 0-->Normal   11. Extinction and Inattention  0-->No abnormality   Total 0 (08/03/24 0903)       Imaging  I personally reviewed all imaging; relevant findings per HPI.    Labs Data   CBC  Recent Labs   Lab 08/03/24  0751 08/02/24  1436   WBC 5.4 8.7   RBC 4.27 4.47   HGB 12.7 13.3   HCT 38.5 39.4   PLT  "320 369     Basic Metabolic Panel   Recent Labs   Lab 08/03/24  0751 08/03/24  0709 08/03/24  0211 08/02/24  1437 08/02/24  1436     --   --   --  133*   POTASSIUM 4.1  --   --   --  4.2   CHLORIDE 99  --   --   --  96*   CO2 24  --   --   --  23   BUN 10.8  --   --   --  11.9   CR 0.54  --   --   --  0.58   * 101* 94   < > 116*   TATI 9.1  --   --   --  9.6    < > = values in this interval not displayed.     Liver Panel  No results for input(s): \"PROTTOTAL\", \"ALBUMIN\", \"BILITOTAL\", \"ALKPHOS\", \"AST\", \"ALT\", \"BILIDIRECT\" in the last 168 hours.  INR    Recent Labs   Lab Test 08/02/24  1436   INR 0.94           Stroke Consult Data Data   Telestroke Service Details  (for non-emergent stroke consult with tele)  Video start time 08/03/24   0900   Video end time 08/03/24   0930   Type of service telemedicine diagnostic assessment of acute neurological changes   Reason telemedicine is appropriate patient requires assessment with a specialist for diagnosis and treatment of neurological symptoms   Mode of transmission secure interactive audio and video communication per Natalia   Originating site (patient location) [facility]    Distant site (provider location) [facility]       I have personally spent a total of 50 minutes providing care today, time spent in reviewing medical records and devising the plan as recorded above.    "

## 2024-08-03 NOTE — CONSULTS
Care Management Initial Consult    General Information  Assessment completed with: Patient,    Type of CM/SW Visit: Offer D/C Planning    Primary Care Provider verified and updated as needed:     Readmission within the last 30 days:        Reason for Consult: discharge planning  Advance Care Planning:            Communication Assessment  Patient's communication style: spoken language (English or Bilingual)    Hearing Difficulty or Deaf: no   Wear Glasses or Blind: no    Cognitive  Cognitive/Neuro/Behavioral: WDL  Level of Consciousness: alert  Arousal Level: opens eyes spontaneously  Orientation: oriented x 4  Mood/Behavior: anxious, cooperative, calm  Best Language: 0 - No aphasia  Speech: clear, spontaneous, logical    Living Environment:   People in home: child(basil), adult, alone     Current living Arrangements: house      Able to return to prior arrangements: yes       Family/Social Support:  Care provided by: self  Provides care for: no one  Marital Status:   Children          Description of Support System: Supportive    Support Assessment: Adequate family and caregiver support    Current Resources:   Patient receiving home care services: No     Community Resources: None  Equipment currently used at home: none  Supplies currently used at home: None    Employment/Financial:  Employment Status:          Financial Concerns:             Does the patient's insurance plan have a 3 day qualifying hospital stay waiver?  No    Lifestyle & Psychosocial Needs:  Social Determinants of Health     Food Insecurity: No Food Insecurity (10/12/2023)    Received from [facility]    Food Insecurity     Worried About Running Out of Food in the Last Year: 1   Depression: Not at risk (10/12/2023)    Received from [facility]    PHQ-2     PHQ-2 TOTAL SCORE: 0   Housing Stability: Low Risk  (10/12/2023)    [facility]     Housing Stability     Unable to Pay for Housing in the Last Year: 1   Tobacco Use: Low Risk  (4/25/2024)    [facility]    Patient History     Smoking Tobacco Use: Never     Smokeless Tobacco Use: Never     Passive Exposure: Not on file   Financial Resource Strain: Low Risk  (10/12/2023)    [facility]    Financial Resource Strain     Difficulty of Paying Living Expenses: 3     Difficulty of Paying Living Expenses: Not on file   Alcohol Use: Not on file   Transportation Needs: No Transportation Needs (10/12/2023)    Received from [facility]    Transportation Needs     Lack of Transportation (Medical): 1   Physical Activity: Not on file   Interpersonal Safety: Not on file   Stress: Not on file   Social Connections: Socially Integrated (10/12/2023)    [facility] from [facility]    Social Connections     Frequency of Communication with Friends and Family: 0   Health Literacy: Not on file       Functional Status:  Prior to admission patient needed assistance:   Dependent ADLs:: Independent  Dependent IADLs:: Independent  Assesssment of Functional Status: At functional baseline    Care Management Discharge Note    Discharge Date: 08/03/2024       Discharge Disposition: Home    Discharge Services: None    Discharge DME: None    Discharge Transportation: family or friend will provide    Private pay costs discussed: Not applicable    Does the patient's insurance plan have a 3 day qualifying hospital stay waiver?  No      Persons Notified of Discharge Plans: Hoa  Patient/Family in Agreement with the Plan:  Yes    Handoff Referral Completed: No    Additional Information:  JOCELYNE met with Hoa at bedside.    She reported that she was working in her garden yesterday and also walked neighbor's dogs while all not feeling well.     She knows now that she should rest if  she is not feeling well.   She is  but currently lives alone with her adult son.    She is considering moving to [place] where her brother lives.  She is independent and has no concerns with discharging home.       KIARA Torres\

## 2024-08-03 NOTE — PLAN OF CARE
Physical Therapy: Orders received. Chart reviewed and discussed with care team.? Physical Therapy not indicated due to pt mobilizing at baseline, all symptoms of weakness have resolved.? Defer discharge recommendations to medical team.? Will complete orders.

## 2024-08-03 NOTE — PLAN OF CARE
"PRIMARY DIAGNOSIS: Accelerated Hypertension  OUTPATIENT/OBSERVATION GOALS TO BE MET BEFORE DISCHARGE:  1. Pain Status: Improved-controlled with oral pain medications.    2. Return to near baseline physical activity: Yes    3. Cleared for discharge by consultants (if involved): No    Discharge Planner Nurse   Safe discharge environment identified: Yes  Barriers to discharge: Yes       Entered by: Marybeth Ascencio RN 08/03/2024 4:44 AM     Please review provider order for any additional goals.   Nurse to notify provider when observation goals have been met and patient is ready for discharge.  Problem: Adult Inpatient Plan of Care  Goal: Plan of Care Review  Description: The Plan of Care Review/Shift note should be completed every shift.  The Outcome Evaluation is a brief statement about your assessment that the patient is improving, declining, or no change.  This information will be displayed automatically on your shift  note.  8/3/2024 0443 by Marybeth Jurado, RN  Outcome: Progressing  Flowsheets (Taken 8/3/2024 0443)  Outcome Evaluation: alert and oriented, denies pain. On RA. SBA. Neurochecks intact. On Tele. PIV saline locked.  Plan of Care Reviewed With: patient  Overall Patient Progress: improving  8/3/2024 0002 by Marybeth Jurado, RN  Outcome: Progressing  Flowsheets (Taken 8/3/2024 0001)  Outcome Evaluation: Pt alert and oriented x 4. C/o headache, Tylenol given. SBA. Voiding adequately. Neurochecks intact. On Tele. Will continue with POC.  Plan of Care Reviewed With: patient  Overall Patient Progress: improving  Goal: Patient-Specific Goal (Individualized)  Description: You can add care plan individualizations to a care plan. Examples of Individualization might be:  \"Parent requests to be called daily at 9am for status\", \"I have a hard time hearing out of my right ear\", or \"Do not touch me to wake me up as it startles  me\".  8/3/2024 0443 by Marybeth Jurado, GREG  Outcome: " Progressing  8/3/2024 0002 by Marybeth Jurado, RN  Outcome: Progressing  Goal: Absence of Hospital-Acquired Illness or Injury  8/3/2024 0443 by Marybeth Jurado, RN  Outcome: Progressing  8/3/2024 0002 by Marybeth Jurado, RN  Outcome: Progressing  Goal: Optimal Comfort and Wellbeing  8/3/2024 0443 by Marybeth Jurado, RN  Outcome: Progressing  8/3/2024 0002 by Marybeth Jurado, RN  Outcome: Progressing  Goal: Readiness for Transition of Care  8/3/2024 0443 by Marybeth Jurado, RN  Outcome: Progressing  8/3/2024 0002 by Marybeth Jurado, RN  Outcome: Progressing     Problem: Fall Injury Risk  Goal: Absence of Fall and Fall-Related Injury  8/3/2024 0443 by Marybeth Jurado, RN  Outcome: Progressing  8/3/2024 0002 by Marybeth Jurado, RN  Outcome: Progressing   Goal Outcome Evaluation:      Plan of Care Reviewed With: patient    Overall Patient Progress: improvingOverall Patient Progress: improving    Outcome Evaluation: alert and oriented, denies pain. On RA. SBA. Neurochecks intact. On Tele. PIV saline locked.

## 2024-08-03 NOTE — PLAN OF CARE
"Goal Outcome Evaluation:      Plan of Care Reviewed With: patient      Pt A/Ox4, VSS on RA. IV removed. Meds received. Discharge instructions given. Belongings returned. Son to transport home.        Problem: Adult Inpatient Plan of Care  Goal: Plan of Care Review  Description: The Plan of Care Review/Shift note should be completed every shift.  The Outcome Evaluation is a brief statement about your assessment that the patient is improving, declining, or no change.  This information will be displayed automatically on your shift  note.  8/3/2024 1325 by Debi Dean RN  Outcome: Met  Flowsheets (Taken 8/3/2024 1325)  Plan of Care Reviewed With: patient  8/3/2024 1323 by Debi Dean RN  Outcome: Progressing  Flowsheets (Taken 8/3/2024 1323)  Plan of Care Reviewed With: patient  Goal: Patient-Specific Goal (Individualized)  Description: You can add care plan individualizations to a care plan. Examples of Individualization might be:  \"Parent requests to be called daily at 9am for status\", \"I have a hard time hearing out of my right ear\", or \"Do not touch me to wake me up as it startles  me\".  8/3/2024 1325 by Debi Dean RN  Outcome: Met  8/3/2024 1323 by Debi Dean RN  Outcome: Progressing  Goal: Absence of Hospital-Acquired Illness or Injury  8/3/2024 1325 by Debi Dean RN  Outcome: Met  8/3/2024 1323 by Debi Dean RN  Outcome: Progressing  Intervention: Identify and Manage Fall Risk  Recent Flowsheet Documentation  Taken 8/3/2024 0900 by Debi Dean RN  Safety Promotion/Fall Prevention: safety round/check completed  Goal: Optimal Comfort and Wellbeing  8/3/2024 1325 by Debi Dean RN  Outcome: Met  8/3/2024 1323 by Debi Dean RN  Outcome: Progressing  Goal: Readiness for Transition of Care  8/3/2024 1325 by Debi Dean RN  Outcome: Met  8/3/2024 1323 by Debi Dean RN  Outcome: Progressing     Problem: Fall Injury Risk  Goal: Absence of " Fall and Fall-Related Injury  8/3/2024 1325 by Debi Dean, RN  Outcome: Met  8/3/2024 1323 by Debi Dean, RN  Outcome: Progressing  Intervention: Identify and Manage Contributors  Recent Flowsheet Documentation  Taken 8/3/2024 0900 by Debi Dean, RN  Medication Review/Management: medications reviewed  Intervention: Promote Injury-Free Environment  Recent Flowsheet Documentation  Taken 8/3/2024 0900 by Debi Dean, RN  Safety Promotion/Fall Prevention: safety round/check completed

## 2024-08-03 NOTE — PLAN OF CARE
"Goal Outcome Evaluation:      Plan of Care Reviewed With: patient        Precautions: contact  Labs/Protocols: neuro checks Q4  Vitals: BP (!) 164/83   Pulse 65   Temp 97.9  F (36.6  C) (Oral)   Resp 18   Ht 1.651 m (5' 5\")   Wt 62.8 kg (138 lb 7.2 oz)   SpO2 95%   BMI 23.04 kg/m    Cardiac: hypertensive   Telemetry: SR 57  Respiratory: WNL  Neuro: A/Ox4, forgetful   GI/: WNL  Skin: WNL  LDAs: PIV SL  Diet: regular   Activity: SBA  Pain: headache - paged MD for prn tylenol   Plan: pain control, neuro checks, cardiac monitoring, PT/OT        Problem: Adult Inpatient Plan of Care  Goal: Plan of Care Review  Description: The Plan of Care Review/Shift note should be completed every shift.  The Outcome Evaluation is a brief statement about your assessment that the patient is improving, declining, or no change.  This information will be displayed automatically on your shift  note.  Outcome: Progressing  Flowsheets (Taken 8/2/2024 1951)  Plan of Care Reviewed With: patient  Goal: Patient-Specific Goal (Individualized)  Description: You can add care plan individualizations to a care plan. Examples of Individualization might be:  \"Parent requests to be called daily at 9am for status\", \"I have a hard time hearing out of my right ear\", or \"Do not touch me to wake me up as it startles  me\".  Outcome: Progressing  Goal: Absence of Hospital-Acquired Illness or Injury  Outcome: Progressing  Intervention: Identify and Manage Fall Risk  Recent Flowsheet Documentation  Taken 8/2/2024 1820 by Debi Dean, RN  Safety Promotion/Fall Prevention:   safety round/check completed   supervised activity  Goal: Optimal Comfort and Wellbeing  Outcome: Progressing  Intervention: Monitor Pain and Promote Comfort  Recent Flowsheet Documentation  Taken 8/2/2024 1815 by Debi Dean RN  Pain Management Interventions: MD notified (comment)  Goal: Readiness for Transition of Care  Outcome: Progressing  Intervention: Mutually " Develop Transition Plan  Recent Flowsheet Documentation  Taken 8/2/2024 1800 by Debi Dean, RN  Equipment Currently Used at Home: none

## 2024-08-03 NOTE — PLAN OF CARE
"PRIMARY DIAGNOSIS: Accelerated Hypertension  OUTPATIENT/OBSERVATION GOALS TO BE MET BEFORE DISCHARGE:  ADLs back to baseline: No    Activity and level of assistance: Up with standby assistance.    Pain status: Improved-controlled with oral pain medications.    Return to near baseline physical activity: No     Discharge Planner Nurse   Safe discharge environment identified: Yes  Barriers to discharge: Yes       Entered by: Marybeth Ascencio RN 08/03/2024 12:03 AM   Pt alert and oriented x 4. C/o headache, Tylenol given. SBA. Voiding adequately. Neurochecks intact. On Tele. Will continue with POC.   Please review provider order for any additional goals.   Nurse to notify provider when observation goals have been met and patient is ready for discharge.  Problem: Adult Inpatient Plan of Care  Goal: Plan of Care Review  Description: The Plan of Care Review/Shift note should be completed every shift.  The Outcome Evaluation is a brief statement about your assessment that the patient is improving, declining, or no change.  This information will be displayed automatically on your shift  note.  Outcome: Progressing  Flowsheets (Taken 8/3/2024 0001)  Outcome Evaluation: Pt alert and oriented x 4. C/o headache, Tylenol given. SBA. Voiding adequately. Neurochecks intact. On Tele. Will continue with POC.  Plan of Care Reviewed With: patient  Overall Patient Progress: improving  Goal: Patient-Specific Goal (Individualized)  Description: You can add care plan individualizations to a care plan. Examples of Individualization might be:  \"Parent requests to be called daily at 9am for status\", \"I have a hard time hearing out of my right ear\", or \"Do not touch me to wake me up as it startles  me\".  Outcome: Progressing  Goal: Absence of Hospital-Acquired Illness or Injury  Outcome: Progressing  Goal: Optimal Comfort and Wellbeing  Outcome: Progressing  Goal: Readiness for Transition of Care  Outcome: Progressing     Problem: Fall " Injury Risk  Goal: Absence of Fall and Fall-Related Injury  Outcome: Progressing   Goal Outcome Evaluation:      Plan of Care Reviewed With: patient    Overall Patient Progress: improvingOverall Patient Progress: improving    Outcome Evaluation: Pt alert and oriented x 4. C/o headache, Tylenol given. SBA. Voiding adequately. Neurochecks intact. On Tele. Will continue with POC.

## 2024-08-03 NOTE — PROGRESS NOTES
Occupational Therapy: Orders received. Chart reviewed and discussed with care team.? Occupational Therapy not indicated due to not having any inpatient OT needs.? Pt is at or close to her previous baseline.  Defer discharge recommendations to treatment team.? Will complete orders.

## 2024-08-03 NOTE — DISCHARGE SUMMARY
"[facility] Discharge Summary          Hoa Britt MRN# [id number]   Age: 69 year old YOB: 1955     Date of Admission:  8/2/2024  Date of Discharge::  8/3/2024  Admitting Physician:  Zohaib Myers MD  Discharge Physician:  Sivan Elias PA-C  Primary Physician: Marilu Jeffrey     Primary Discharge Diagnoses:   Episode of transient lightheadedness, confusion, and right arm numbness.     Hospital Course:   For detail history, please refer to H & P from 8/2/2024. In brief, this is a 69 year old female admitted on 8/2/2024. She has a history of hypertension, chronic neck pain, prior cervical neck surgeries, generalized anxiety disorder presents the ED for evaluation of right-sided numbness.  Awoke with numbness of right side of face, arm, and leg morning of admission.  Subjectively feels weak but no objective weakness present.  CT stroke series and MRI negative for intracranial pathology or large vessel occlusion/stenosis.      Patient's episode of transient lightheadedness, confusion, and right arm numbness resolved.  MRI brain was negative for acute stroke.  Neurology was consulted and felt \"While TIA remains in the differential, right arm symptoms could also be explained by her chronic neck pain as she reports previous episodes of numbness in the setting of pain flares.  She was also noted to be significantly hypertensive (SBP in the 200s) on presentation.  Will focus on optimizing vascular risk factors.\"  - patient was discharged on ASA and Atorvastatin  - monitor blood pressures and follow up with PCP for ongoing management  - complete a 14 day zio patch monitor  - follow up with PCP for chronic neck pain; asymptomatic at the time of discharge  - follow up in the next 1-2 week(s) with PCP  - follow up in 6-8 weeks with general neurology     Procedures/Imaging:     Results for orders placed or performed during the hospital encounter of 08/02/24   CT Head w/o Contrast    " Narrative    EXAM: CT HEAD W/O CONTRAST  8/2/2024 2:50 PM     HISTORY:  Code Stroke to evaluate for potential thrombolysis and  thrombectomy. PLEASE READ IMMEDIATELY.       COMPARISON:  No prior similar studies    TECHNIQUE: Using multidetector thin collimation helical acquisition  technique, axial, coronal and sagittal CT images from the skull base  to the vertex were obtained without intravenous contrast.   (topogram) image(s) also obtained and reviewed. Dose reduction  techniques were performed.    FINDINGS:  No intracranial hemorrhage, mass effect, or midline shift. No definite  gray-white matter differentiation loss. Relative hypointensity in the  left perirolandic region (series 3, image 22). This is not well-seen  on sagittal and coronal images and can be artifactual. Ventricles are  proportionate to the cerebral sulci. Clear basal cisterns.    The bony calvaria and the bones of the skull base are normal. The  visualized portions of the paranasal sinuses and mastoid air cells are  clear. Grossly normal orbits.       Impression    IMPRESSION:   1. No acute intracranial hemorrhage or no definite gray-white matter  differentiation loss.  2. Relative hypointensity in the left perirolandic region (series 3,  image 22). This is not well-seen on sagittal and coronal images and  can be artifactual.    Findings were discussed with at 15:04 hours CDT.     SIMON KAPOOR MD         SYSTEM ID:  [id number]   CTA Head Neck with Contrast    Narrative    EXAM: CTA HEAD NECK W CONTRAST, CT HEAD PERFUSION W CONTRAST  8/2/2024  2:55 PM     HISTORY:  Code Stroke to evaluate for potential thrombolysis and  thrombectomy. PLEASE READ IMMEDIATELY.       COMPARISON:  Head CT same-day    TECHNIQUE:    HEAD AND NECK CTA: During rapid bolus intravenous injection of  nonionic contrast material, axial images were obtained using thin  collimation multidetector helical technique from the base of the upper  aortic arch through the  [place] of [place]. This CT angiogram data was  reconstructed at thin intervals with mild overlap. Images were sent to  the 3D workstation, and 3D reconstructions were obtained. The axial  source images, multiplanar reformations, 3D reconstructions in both  maximum intensity projection display and volume rendered models were  reviewed, with reconstructions performed by the technologist and the  radiologist.  CT PERFUSION: Dynamic perfusion CT of the brain was performed at  multiple levels; perfusion was measured and imaged during rapid bolus  intravenous injection of nonionic iodinated contrast medium. Images  were post-processed, reconstructed, and reviewed.     CONTRAST: 67mL Isovue-370 (accession [id number]), 50mL Isovue-370  (accession [id number])    FINDINGS:    Head CTA demonstrates no intracranial arterial aneurysm or stenosis.    Neck CTA demonstrates patent major cervical arteries. Mild to moderate  focal stenosis at the origin of the right vertebral artery secondary  to soft plaque. Mixed calcified and soft plaque at the right ICA  bifurcation without high-grade stenosis. Patent and conventional  aortic arch branching pattern.    No acute finding in the visualized neck soft tissues, or in the  superior mediastinum/thorax. 6 mm hypodense nodule in the right  thyroid nodule.    CT perfusion demonstrates no evidence of ischemia or acute infarction.  There is normal perfusion of the brain at the visualized levels.       Impression    IMPRESSION:    1. Head CTA demonstrates patent major intracranial arteries without  large vessel occlusion, high-grade stenosis or aneurysm.   2. Neck CTA demonstrates patent major cervical arteries. Mild to  moderate stenosis at the origin of the right vertebral artery. Mixed  calcified and soft plaque at the right ICA bifurcation without  significant stenosis.  3. Head CT perfusion demonstrates no evidence of ischemia or infarct.    Findings were discussed with Dr. Hodges at  15:04 hours CDT.    SIMON KAPOOR MD         SYSTEM ID:  [id number]   CT Head Perfusion w Contrast - For Tier 2 Stroke    Narrative    EXAM: CTA HEAD NECK W CONTRAST, CT HEAD PERFUSION W CONTRAST  8/2/2024  2:55 PM     HISTORY:  Code Stroke to evaluate for potential thrombolysis and  thrombectomy. PLEASE READ IMMEDIATELY.       COMPARISON:  Head CT same-day    TECHNIQUE:    HEAD AND NECK CTA: During rapid bolus intravenous injection of  nonionic contrast material, axial images were obtained using thin  collimation multidetector helical technique from the base of the upper  aortic arch through the [place] of [place]. This CT angiogram data was  reconstructed at thin intervals with mild overlap. Images were sent to  the 3D workstation, and 3D reconstructions were obtained. The axial  source images, multiplanar reformations, 3D reconstructions in both  maximum intensity projection display and volume rendered models were  reviewed, with reconstructions performed by the technologist and the  radiologist.  CT PERFUSION: Dynamic perfusion CT of the brain was performed at  multiple levels; perfusion was measured and imaged during rapid bolus  intravenous injection of nonionic iodinated contrast medium. Images  were post-processed, reconstructed, and reviewed.     CONTRAST: 67mL Isovue-370 (accession [id number]), 50mL Isovue-370  (accession [id number])    FINDINGS:    Head CTA demonstrates no intracranial arterial aneurysm or stenosis.    Neck CTA demonstrates patent major cervical arteries. Mild to moderate  focal stenosis at the origin of the right vertebral artery secondary  to soft plaque. Mixed calcified and soft plaque at the right ICA  bifurcation without high-grade stenosis. Patent and conventional  aortic arch branching pattern.    No acute finding in the visualized neck soft tissues, or in the  superior mediastinum/thorax. 6 mm hypodense nodule in the right  thyroid nodule.    CT perfusion demonstrates no evidence  of ischemia or acute infarction.  There is normal perfusion of the brain at the visualized levels.       Impression    IMPRESSION:    1. Head CTA demonstrates patent major intracranial arteries without  large vessel occlusion, high-grade stenosis or aneurysm.   2. Neck CTA demonstrates patent major cervical arteries. Mild to  moderate stenosis at the origin of the right vertebral artery. Mixed  calcified and soft plaque at the right ICA bifurcation without  significant stenosis.  3. Head CT perfusion demonstrates no evidence of ischemia or infarct.    Findings were discussed with Dr. Hodges at 15:04 hours CDT.    SIMON KAPOOR MD         SYSTEM ID:  [id number]   MR Brain w/o & w Contrast    Narrative     MR BRAIN W/O & W CONTRAST 8/2/2024 4:28 PM    Provided History: stroke symptoms.    Comparison: CT and CTA same-day.    Technique: Multiplanar multisequence brain MRI without and with  contrast.     Contrast: 6mL Gadavist     Findings:  There is no mass effect, midline shift, or evidence of intracranial  hemorrhage. The ventricles are proportionate to the cerebral sulci.  Normal major vascular intracranial flow-voids. Mild leukoaraiosis. 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    Postcontrast images demonstrate no abnormal intracranial enhancement.    No abnormality of the skull marrow signal. The visualized portions of  paranasal sinuses, and mastoid air cells are relatively clear. The  orbits are grossly unremarkable.      Impression    Impression:  1. No acute intracranial pathology.  2. Mild generalized cerebral volume loss and chronic small vessel  ischemic disease.  2. 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    SIMON KAPOOR MD         SYSTEM ID:  [id number]   Echocardiogram Complete - For age > 60 yrs     Value    LVEF  55-60%    Narrative     [id number]  [id number]  [id number]  [id number]^JUANITA^BEAU     [facility]  Echocardiography Laboratory  [address] [place] [address]     Name: MARNI TABARES  MRN: [id number]  : 1955  Study Date: 2024 10:04 AM  Age: 69 yrs  Gender: Female  Patient [facility]: [facility]  Reason For Study: Cerebrovascular Incident  Ordering Physician: JUAN JOSÉ GRANT  Referring Physician: Marilu Jeffrey  Performed By: Marielos Pond RDCS     BSA: 1.7 m2  Height: 65 in  Weight: 138 lb  HR: 59  BP: 164/77 mmHg  ______________________________________________________________________________  Procedure  Complete Portable Echo Adult.  ______________________________________________________________________________  Interpretation Summary     There is mild concentric left ventricular hypertrophy.  Left ventricular systolic function is normal.  There is no thrombus seen in the left ventricle.  There is no color Doppler evidence of a PFO.  Bubble study not performed  The aortic valve is trileaflet with aortic valve sclerosis.  Trivial aortic valve stenosis mean gradient 7mmHg, mean area 1.8cm2  There is trace to mild aortic regurgitation.  Aortic root dilatation is present.  Sinus Valsalva 40mm, asc aorta size normal  ______________________________________________________________________________  Left Ventricle  The left ventricle is normal in size. There is mild concentric left  ventricular hypertrophy. Left ventricular systolic function is normal. The  visual ejection fraction is 55-60%. Left ventricular diastolic function is  normal. Normal left ventricular wall motion. There is no thrombus seen in the  left ventricle.     Right Ventricle  The right ventricle is normal in size and function.     Atria  Normal left atrial size. Right atrial size is normal. There is no color  Doppler evidence of a PFO. Bubble study not performed.     Mitral Valve  There is trace mitral regurgitation.     Tricuspid  Valve  There is trace tricuspid regurgitation. Doppler findings do not suggest  pulmonary hypertension. IVC diameter <2.1 cm collapsing >50% with sniff  suggests a normal RA pressure of 3 mmHg.     Aortic Valve  The aortic valve is trileaflet with aortic valve sclerosis. There is trace to  mild aortic regurgitation. Trivial aortic valve stenosis mean gradient 7mmHg,  mean area 1.8cm2.     Pulmonic Valve  The pulmonic valve is not well seen, but is grossly normal.     Vessels  Aortic root dilatation is present. Sinus Valsalva 40mm, asc aorta size normal.     Pericardium  The pericardium appears normal.     Rhythm  Sinus rhythm was noted.  ______________________________________________________________________________  MMode/2D Measurements & Calculations  IVSd: 1.3 cm     LVIDd: 3.7 cm  LVIDs: 2.3 cm  LVPWd: 1.1 cm  IVC diam: 1.6 cm  FS: 37.4 %  LV mass(C)d: 145.9 grams  LV mass(C)dI: 86.4 grams/m2  Ao root diam: 4.0 cm  LA dimension: 2.9 cm  asc Aorta Diam: 3.2 cm  LA/Ao: 0.71  LVOT diam: 2.3 cm  LVOT area: 4.2 cm2  Ao root diam index Ht(cm/m): 2.4  Ao root diam index BSA (cm/m2): 2.4  Asc Ao diam index BSA (cm/m2): 1.9  Asc Ao diam index Ht(cm/m): 2.0  LA Volume (BP): 47.8 ml     LA Volume Index (BP): 28.3 ml/m2  RV Base: 3.4 cm  RWT: 0.58  TAPSE: 2.4 cm     Doppler Measurements & Calculations  MV E max leodan: 57.1 cm/sec  MV A max leodan: 78.8 cm/sec  MV E/A: 0.72  MV dec slope: 199.0 cm/sec2  MV dec time: 0.29 sec  Ao V2 max: 198.9 cm/sec  Ao max P.0 mmHg  Ao V2 mean: 125.3 cm/sec  Ao mean P.1 mmHg  Ao V2 VTI: 42.8 cm  DEMETRI(I,D): 3.1 cm2  DEMETRI(V,D): 3.1 cm2  LV V1 max P.7 mmHg  LV V1 max: 147.3 cm/sec  LV V1 VTI: 31.9 cm  SV(LVOT): 134.2 ml  SI(LVOT): 79.4 ml/m2  PA acc time: 0.09 sec  TR max leodan: 216.6 cm/sec  TR max P.8 mmHg  AV Leodan Ratio (DI): 0.74  DEMETRI Index (cm2/m2): 1.9     E/E' av.8  Lateral E/e': 4.7  Medial E/e': 6.8  RV S Leodan: 16.8 cm/sec    "  ______________________________________________________________________________  Report approved by: Marie Esquivel 08/03/2024 12:08 PM           Allergies:   No Known Allergies     Subjective:   Patient denies any headache, neck pain, numbness, tingling or weakness today.  Feels improved since admission.      Physical Exam:   Blood pressure (!) 151/65, pulse 50, temperature 98.2  F (36.8  C), temperature source Oral, resp. rate 18, height 1.651 m (5' 5\"), weight 62.8 kg (138 lb 7.2 oz), SpO2 94%.  General: Alert, interactive, NAD  HEENT: AT/NC.  No neck pain to palpation  Resp: clear to auscultation bilaterally, no crackles or wheezes  Cardiac: regular rate and rhythm, no murmur  Abdomen: Soft, nontender, nondistended. +BS.  No rebound or guarding.  Extremities: No LE edema, 5/5 muscle strength in BUE/BLE  Skin: Warm and dry  Neuro: Alert & oriented x 3, no focal deficits, moves all extremities equally     Discharge Medicatios:        Current Discharge Medication List        START taking these medications    Details   acetaminophen (TYLENOL) 325 MG tablet Take 2 tablets (650 mg) by mouth every 4 hours as needed for mild pain    Associated Diagnoses: Right sided numbness      aspirin 81 MG EC tablet Take 1 tablet (81 mg) by mouth daily  Qty: 30 tablet, Refills: 0    Associated Diagnoses: Right sided numbness      atorvastatin (LIPITOR) 20 MG tablet Take 1 tablet (20 mg) by mouth every evening  Qty: 30 tablet, Refills: 0    Associated Diagnoses: Right sided numbness           CONTINUE these medications which have NOT CHANGED    Details   albuterol (PROAIR HFA/PROVENTIL HFA/VENTOLIN HFA) 108 (90 Base) MCG/ACT inhaler Inhale 2 puffs into the lungs every 6 hours as needed for shortness of breath, wheezing or cough      amLODIPine (NORVASC) 5 MG tablet Take 5 mg by mouth daily      calcium carbonate-vitamin D (CALTRATE) 600-10 MG-MCG per tablet Take 1 tablet by mouth daily      escitalopram (LEXAPRO) 5 MG tablet " Take 2.5 mg by mouth daily      famotidine (PEPCID) 40 MG tablet Take 40 mg by mouth 2 times daily      LORazepam (ATIVAN) 2 MG tablet Take 0.5 tablets by mouth nightly as needed for anxiety.  Qty: 14 tablet, Refills: 0    Associated Diagnoses: Small bowel obstruction (H)      triamcinolone (KENALOG) 0.1 % external cream Apply topically 2 times daily as needed for irritation             Instructions Given to Patient as Discharge:     Discharge Procedure Orders   Reason for your hospital stay   Order Comments: You were hospitalized due to an episode of transient lightheadedness, confusion, and right arm numbness.  MRI brain was negative for acute stroke.  While TIA remained in the differential, right arm symptoms could also be explained by your chronic neck pain as you reported previous episodes of numbness in the setting of pain flares.  You were also noted to be significantly hypertensive (SBP in the 200s) on presentation which resolved.    Neurology was consulted and recommend to continue ASA 81 mg daily and start Atorvastatin 20 mg daily    Long term goal BP <130/80 with tighter control associated with decreased overall CV risk, if tolerated. Discussed the importance of home BP monitoring and keeping a log for PCP.     Follow-up and recommended labs and tests    Order Comments: Complete the 14 day Ziopatch to screen for atrial fibrillation.  Monitor your blood pressure and bring a log to your PCP.    Patient Follow-up    - in the next 1-2 week(s) with PCP  - in 6-8 weeks with general neurology ([phone])     Activity   Order Comments: Your activity upon discharge: activity as tolerated     Order Specific Question Answer Comments   Is discharge order? Yes      Diet   Order Comments: Follow this diet upon discharge: Orders Placed This Encounter      Regular Diet Adult     Order Specific Question Answer Comments   Is discharge order? Yes      Stroke Hospital Follow Up   Standing Status: Future Standing Exp.  Date: 08/03/25   Order Comments: Please be aware that coverage of these services is subject to the terms and limitations of your health insurance plan.  Call member services at your health plan with any benefit or coverage questions.  [facility] will call you to coordinate care as prescribed by your provider. If you don t hear from a representative within 2 business days, please call [phone].       Order Specific Question Answer Comments   Schedule Patient With: General Neurology    Specific Diagnosis: TIA vs radiculopathy    Contact: Patient    Scheduling Instructions: [facility] will call you to coordinate care as prescribed by your provider. If you don t hear from a representative within 2 business days, please call [phone].    Follow up range in weeks (after discharge) 6-8      ZIO PATCH MAIL OUT   Standing Status: Future Standing Exp. Date: 08/03/25     Order Specific Question Answer Comments   Order completed for? Adult Cardiology    Patient should wear the monitor for 14 days    Does the patient have an Neither        Pending Tests at Discharge:   Zio patch    Discharge Disposition:     Discharged to home     Sivan Elias MS, PA-C  Hospitalist Service  Pager [phone]    >30 minutes was spent in discharge planning, care coordination, physical examination and medication reconciliation on the date of discharge, 8/3/2024

## 2024-08-04 ENCOUNTER — ORDERS ONLY (AUTO-RELEASED) (OUTPATIENT)
Dept: MEDSURG UNIT | Facility: CLINIC | Age: 69
End: 2024-08-04
Payer: MEDICARE

## 2024-08-04 DIAGNOSIS — R20.0 RIGHT SIDED NUMBNESS: ICD-10-CM

## 2024-08-04 PROCEDURE — 93248 EXT ECG>7D<15D REV&INTERPJ: CPT | Performed by: INTERNAL MEDICINE

## 2024-08-29 ENCOUNTER — TELEPHONE (OUTPATIENT)
Dept: NEUROLOGY | Facility: CLINIC | Age: 69
End: 2024-08-29
Payer: MEDICARE

## 2024-08-29 NOTE — TELEPHONE ENCOUNTER
Stroke RN Care Coordination - Follow-Up Outreach Note     SITUATION     Hoa Britt is a 69 year old female who is receiving support for:  Results (Ziopatch Heart Monitor)    BACKGROUND     Received return VM from pt this afternoon.    ASSESSMENT     Spoke with pt and informed her of the results. Explained that she does not require any additional cardiac testing at this time. Advised she continue on 81mg asa per IP teams recommendations and to follow up with PCP for ongoing vascular risk factor management. Also told her that I sent a copy of the results to her PCP for their awareness. Pt voiced understanding and did not have any other questions or concerns at this time.    PLAN     Follow-up plan:  No further planned outreaches.      Margo Duron BS, RN, SCRN  RN Stroke Neurology Care Coordinator  [facility] Neuroscience Service Line

## 2024-08-29 NOTE — TELEPHONE ENCOUNTER
Stroke RN Care Coordination - Unable to Reach / Voicemail Note     Stroke RN Care Coordinator Outreach:  Results (Ziopatch Heart Monitor)     Outreach attempted x 1.      Left message on patient's voicemail with call back information and requested return call.    Stroke RN Care Coordinator will try to reach patient again in 1-2 business days.    Faxed gabbiopatch summary report to pt's PCP at [facility] at F: [phone] Confirmation below.        Margo Duron BS, RN, SCRN  RN Stroke Neurology Care Coordinator  [facility] Neuroscience Service Line

## 2024-08-29 NOTE — TELEPHONE ENCOUNTER
----- Message from Wendy Pruett sent at 8/28/2024  3:39 PM CDT -----  Per cardiology report no atrial fibrillation noted.     Margo, do you mind calling and updating Hoa regarding her ziopatch results?  Thank you.

## (undated) DEVICE — KIT ENDO TURNOVER/PROCEDURE W/CLEAN A SCOPE LINERS 103888

## (undated) DEVICE — ENDO BITE BLOCK ADULT OLYMPUS LATEX FREE MAJ-1632

## (undated) DEVICE — ENDO FORCEP ENDOJAW BIOPSY 2.8MMX160CM FB-220K

## (undated) RX ORDER — FENTANYL CITRATE 50 UG/ML
INJECTION, SOLUTION INTRAMUSCULAR; INTRAVENOUS
Status: DISPENSED
Start: 2021-06-03